# Patient Record
Sex: MALE | Race: WHITE | Employment: FULL TIME | URBAN - METROPOLITAN AREA
[De-identification: names, ages, dates, MRNs, and addresses within clinical notes are randomized per-mention and may not be internally consistent; named-entity substitution may affect disease eponyms.]

---

## 2017-01-31 ENCOUNTER — OFFICE VISIT (OUTPATIENT)
Dept: INTERNAL MEDICINE CLINIC | Facility: CLINIC | Age: 32
End: 2017-01-31

## 2017-01-31 VITALS
OXYGEN SATURATION: 97 % | HEART RATE: 97 BPM | WEIGHT: 178.19 LBS | TEMPERATURE: 98 F | HEIGHT: 68 IN | SYSTOLIC BLOOD PRESSURE: 108 MMHG | DIASTOLIC BLOOD PRESSURE: 78 MMHG | BODY MASS INDEX: 27.01 KG/M2

## 2017-01-31 DIAGNOSIS — N48.89 SORE OF PENIS: ICD-10-CM

## 2017-01-31 DIAGNOSIS — J06.9 UPPER RESPIRATORY TRACT INFECTION, UNSPECIFIED TYPE: Primary | ICD-10-CM

## 2017-01-31 DIAGNOSIS — R10.9 ABDOMINAL DISCOMFORT: ICD-10-CM

## 2017-01-31 PROCEDURE — 99212 OFFICE O/P EST SF 10 MIN: CPT | Performed by: INTERNAL MEDICINE

## 2017-01-31 PROCEDURE — 99214 OFFICE O/P EST MOD 30 MIN: CPT | Performed by: INTERNAL MEDICINE

## 2017-01-31 RX ORDER — AZITHROMYCIN 250 MG/1
TABLET, FILM COATED ORAL
Qty: 6 TABLET | Refills: 0 | Status: SHIPPED | OUTPATIENT
Start: 2017-01-31 | End: 2017-03-06 | Stop reason: ALTCHOICE

## 2017-01-31 NOTE — PROGRESS NOTES
Dannielle Monzon is a 36year old male   HPI:   Pt.presents for the following problems. Yesterday patient started with dry hacking cough. Chest discomfort with coughing. Some wheezing. She also has body aches. Achy lower back. Abdominal discomfort. Smoking Status: Never Smoker                      Smokeless Status: Never Used                        Alcohol Use: Yes           0.0 oz/week       0 Standard drinks or equivalent per week       Comment: 2-3 glasses of wine  daily          REVIEW OF SYSTEMS Patient comfortable with this plan. 2. Sore of penis  Discussed with patient. He strongly feels this is from excessive masturbation. I have asked patient to avoid masturbation for 7-10 days.   This area should heal.  I have also advised him that if [facility]

## 2017-02-01 ENCOUNTER — TELEPHONE (OUTPATIENT)
Dept: INTERNAL MEDICINE CLINIC | Facility: CLINIC | Age: 32
End: 2017-02-01

## 2017-02-01 NOTE — TELEPHONE ENCOUNTER
Pt. Would like to speak with Dr. Yassine Monzon  Didn't give any additional info   Ph. # [phone]   Routed to clinical

## 2017-02-01 NOTE — TELEPHONE ENCOUNTER
Suspect diarrhea is side effect of Zithromax use; hopefully, it will lessen as Zithromax dose decreases to 250 mg daily; if diarrhea persists, then Zithromax may be held, and an alternative antibiotic may be considered; advise continue Zithromax for now; a

## 2017-02-01 NOTE — TELEPHONE ENCOUNTER
Pt was here yesterday to see Dr. Donell Vilchis for a URI. He was given a Z-pack. He had diarrhea x 2 last night and is concerned about that. I recommended yogurt or probiotics with antibiotics. Routed to Dr. Bobbi Natarajan. Please advise.

## 2017-02-02 ENCOUNTER — OFFICE VISIT (OUTPATIENT)
Dept: INTERNAL MEDICINE CLINIC | Facility: CLINIC | Age: 32
End: 2017-02-02

## 2017-02-02 VITALS
DIASTOLIC BLOOD PRESSURE: 80 MMHG | HEART RATE: 80 BPM | TEMPERATURE: 99 F | HEIGHT: 68 IN | SYSTOLIC BLOOD PRESSURE: 114 MMHG | BODY MASS INDEX: 27.28 KG/M2 | WEIGHT: 180 LBS

## 2017-02-02 DIAGNOSIS — G25.81 RLS (RESTLESS LEGS SYNDROME): ICD-10-CM

## 2017-02-02 DIAGNOSIS — E87.6 HYPOKALEMIA: ICD-10-CM

## 2017-02-02 DIAGNOSIS — J06.9 UPPER RESPIRATORY TRACT INFECTION, UNSPECIFIED TYPE: Primary | ICD-10-CM

## 2017-02-02 DIAGNOSIS — N20.0 NEPHROLITHIASIS: ICD-10-CM

## 2017-02-02 PROCEDURE — 99212 OFFICE O/P EST SF 10 MIN: CPT | Performed by: INTERNAL MEDICINE

## 2017-02-02 PROCEDURE — 99214 OFFICE O/P EST MOD 30 MIN: CPT | Performed by: INTERNAL MEDICINE

## 2017-02-02 RX ORDER — AMOXICILLIN AND CLAVULANATE POTASSIUM 875; 125 MG/1; MG/1
1 TABLET, FILM COATED ORAL 2 TIMES DAILY
Qty: 20 TABLET | Refills: 0 | Status: SHIPPED | OUTPATIENT
Start: 2017-02-02 | End: 2017-02-12

## 2017-02-02 NOTE — PROGRESS NOTES
Kevin Ballard is a 36year old male. HPI:   Patient presents with:  URI: Pt was here on Tuesday with Dr. Lilly Allen. He started a Z-pack. He is expereincing sinus pressure on the left side of his face, though it does move.  He also has a nagging cough that Disp: 90 tablet Rfl: 3   hydrochlorothiazide 50 MG Oral Tab Take 1 tablet (50 mg total) by mouth daily.  Disp: 90 tablet Rfl: 3       Allergies:  No Known Allergies              ROS:   Constitutional: no weight loss; no fatigue  Cardiovascular:  Negative fo months           Orders This Visit:  No orders of the defined types were placed in this encounter.        Meds This Visit:    Signed Prescriptions Disp Refills    Amoxicillin-Pot Clavulanate (AUGMENTIN) 875-125 MG Oral Tab 20 tablet 0      Sig: Take 1 table

## 2017-03-03 ENCOUNTER — TELEPHONE (OUTPATIENT)
Dept: NEUROLOGY | Facility: CLINIC | Age: 32
End: 2017-03-03

## 2017-03-06 ENCOUNTER — OFFICE VISIT (OUTPATIENT)
Dept: NEUROLOGY | Facility: CLINIC | Age: 32
End: 2017-03-06

## 2017-03-06 VITALS
WEIGHT: 175 LBS | HEIGHT: 68 IN | DIASTOLIC BLOOD PRESSURE: 76 MMHG | HEART RATE: 76 BPM | BODY MASS INDEX: 26.52 KG/M2 | OXYGEN SATURATION: 98 % | SYSTOLIC BLOOD PRESSURE: 120 MMHG

## 2017-03-06 DIAGNOSIS — G47.61 PERIODIC LIMB MOVEMENTS OF SLEEP: Primary | ICD-10-CM

## 2017-03-06 PROCEDURE — 99244 OFF/OP CNSLTJ NEW/EST MOD 40: CPT | Performed by: OTHER

## 2017-03-06 NOTE — PATIENT INSTRUCTIONS
Refill policies:    • Allow 2 business days for refills; controlled substances may take longer. • Contact our office at least 5 days prior to running out of medication or submit request through the “request refill” option in your [facility] account.   • Ref have a procedure or additional testing performed. [facility]) will contact your insurance carrier to obtain pre-certification or prior authorization.     Unfortunately, [employer] has seen an increase in denial of payment even though the p

## 2017-03-06 NOTE — PROGRESS NOTES
Luma Shankster : 1985     HPI:   Patient presents with:  Neurologic Problem: New right-handed patient referred by Orlando Kerr. Pt c/o sleepness nights. Sx started 2 years ago. Pt was Dx with Restless leg syndrome.  Pt taking Clonazepam 0.5 MG, Which Disp: 90 tablet Rfl: 3     No current facility-administered medications for this visit. Past Medical History   Diagnosis Date   • Kidney stone      KUB 2008   • Esophageal reflux       History reviewed. No pertinent past surgical history.    Family Histor span and concentration. Speech and language normal.  Oriented times three. Recent and remote memory intact, with normal fund of knowledge. Cranial Nerves: II-Visual acuity grossly normal, with full visual fields. Pupil react to light.   Fundoscopic exam

## 2017-03-08 RX ORDER — HYDROCHLOROTHIAZIDE 50 MG/1
50 TABLET ORAL DAILY
Qty: 90 TABLET | Refills: 3 | OUTPATIENT
Start: 2017-03-08

## 2017-03-08 RX ORDER — PRAMIPEXOLE DIHYDROCHLORIDE 0.12 MG/1
0.12 TABLET ORAL NIGHTLY
Qty: 90 TABLET | Refills: 3 | OUTPATIENT
Start: 2017-03-08

## 2017-03-08 RX ORDER — POTASSIUM CHLORIDE 20 MEQ/1
20 TABLET, EXTENDED RELEASE ORAL DAILY
Qty: 90 TABLET | Refills: 3 | OUTPATIENT
Start: 2017-03-08

## 2017-03-09 RX ORDER — CLONAZEPAM 0.5 MG/1
0.5 TABLET ORAL NIGHTLY PRN
Qty: 90 TABLET | Refills: 1 | Status: SHIPPED
Start: 2017-03-09 | End: 2017-10-25

## 2017-03-09 RX ORDER — CLONAZEPAM 0.5 MG/1
0.5 TABLET ORAL NIGHTLY PRN
Qty: 30 TABLET | Refills: 3 | Status: SHIPPED
Start: 2017-03-09 | End: 2017-03-09

## 2017-03-31 ENCOUNTER — OFFICE VISIT (OUTPATIENT)
Dept: INTERNAL MEDICINE CLINIC | Facility: CLINIC | Age: 32
End: 2017-03-31

## 2017-03-31 VITALS
OXYGEN SATURATION: 96 % | BODY MASS INDEX: 27 KG/M2 | HEART RATE: 80 BPM | WEIGHT: 174.38 LBS | DIASTOLIC BLOOD PRESSURE: 76 MMHG | SYSTOLIC BLOOD PRESSURE: 106 MMHG | TEMPERATURE: 98 F

## 2017-03-31 DIAGNOSIS — J20.9 ACUTE BRONCHITIS, UNSPECIFIED ORGANISM: Primary | ICD-10-CM

## 2017-03-31 PROCEDURE — 99212 OFFICE O/P EST SF 10 MIN: CPT | Performed by: INTERNAL MEDICINE

## 2017-03-31 PROCEDURE — 99213 OFFICE O/P EST LOW 20 MIN: CPT | Performed by: INTERNAL MEDICINE

## 2017-03-31 RX ORDER — AMOXICILLIN AND CLAVULANATE POTASSIUM 875; 125 MG/1; MG/1
1 TABLET, FILM COATED ORAL 2 TIMES DAILY
Qty: 20 TABLET | Refills: 0 | Status: SHIPPED | OUTPATIENT
Start: 2017-03-31 | End: 2017-04-10

## 2017-03-31 NOTE — PROGRESS NOTES
Frances Khna is a 36year old male. Patient presents with:  Cough: dry cough - \"mucus at bottom of throat\", Robitussin DM is not helping - pt thinks he has early bronchitis. Sx onset: Wednesday. Denies fever/chills.     HPI:   Patient presents with:  C chest pain  GI: No abdominal pain, nausea, vomiting, diarrhea, or constipation  :No Urinary complaints  EXT:No complaints of pain or swelling in patient's legs    EXAM:   /76 mmHg  Pulse 80  Temp(Src) 98.4 °F (36.9 °C)  Wt 174 lb 6.4 oz (79.107 kg)

## 2017-03-31 NOTE — PATIENT INSTRUCTIONS
1.  Patient is to push fluids and rest.  2.  I will prescribe Augmentin 875 mg twice a day for 10 days for the patient to take. 3.  Patient can use his Sudafed as necessary for head congestion.   4.  Patient can continue to use Robitussin and Mucinex as ne

## 2017-04-20 ENCOUNTER — OFFICE VISIT (OUTPATIENT)
Dept: INTERNAL MEDICINE CLINIC | Facility: CLINIC | Age: 32
End: 2017-04-20

## 2017-04-20 VITALS
HEIGHT: 68 IN | DIASTOLIC BLOOD PRESSURE: 100 MMHG | BODY MASS INDEX: 26.83 KG/M2 | WEIGHT: 177 LBS | SYSTOLIC BLOOD PRESSURE: 122 MMHG | HEART RATE: 68 BPM | TEMPERATURE: 99 F

## 2017-04-20 DIAGNOSIS — G25.81 RLS (RESTLESS LEGS SYNDROME): ICD-10-CM

## 2017-04-20 DIAGNOSIS — R03.0 ELEVATED BP WITHOUT DIAGNOSIS OF HYPERTENSION: ICD-10-CM

## 2017-04-20 DIAGNOSIS — N20.0 NEPHROLITHIASIS: ICD-10-CM

## 2017-04-20 DIAGNOSIS — E87.6 HYPOKALEMIA: ICD-10-CM

## 2017-04-20 DIAGNOSIS — F32.A DEPRESSION, UNSPECIFIED DEPRESSION TYPE: ICD-10-CM

## 2017-04-20 DIAGNOSIS — R53.83 OTHER FATIGUE: Primary | ICD-10-CM

## 2017-04-20 PROCEDURE — 99214 OFFICE O/P EST MOD 30 MIN: CPT | Performed by: INTERNAL MEDICINE

## 2017-04-20 PROCEDURE — 99212 OFFICE O/P EST SF 10 MIN: CPT | Performed by: INTERNAL MEDICINE

## 2017-04-20 NOTE — PROGRESS NOTES
"Chief Complaint   Patient presents with     Eye Problem     right eye discharge       Initial Pulse 160  Temp 98  F (36.7  C) (Rectal)  Wt 13 lb 5.5 oz (6.053 kg) Estimated body mass index is 15.13 kg/(m^2) as calculated from the following:    Height as of 10/17/17: 1' 10.84\" (0.58 m).    Weight as of 10/17/17: 11 lb 3.5 oz (5.089 kg).  Medication Reconciliation: complete   RANDOLPH Marte MA      " Kristi Najera is a 36year old male. HPI:   Patient presents with:  Depression: Pt states he has \"self-diagnosed himself with depression\" and wanted to get a medical opinion.  He states he has not felt motivated to do things, has a poor appetite and d daily. Disp: 90 tablet Rfl: 3       Allergies:  No Known Allergies              ROS:   Constitutional: no weight loss; no fatigue  Cardiovascular:  Negative for chest pain; negative palpitations  Respiratory:  Negative for cough, dyspnea and wheezing  Trupti placed in this encounter. Meds This Visit:    Signed Prescriptions Disp Refills    Sertraline HCl 50 MG Oral Tab 30 tablet 5      Sig: Take 1 tablet (50 mg total) by mouth daily.            Imaging & Referrals:  None     4/20/2017  BRII Arellano

## 2017-04-24 ENCOUNTER — TELEPHONE (OUTPATIENT)
Dept: INTERNAL MEDICINE CLINIC | Facility: CLINIC | Age: 32
End: 2017-04-24

## 2017-04-24 NOTE — TELEPHONE ENCOUNTER
Please advise - called patient who is stating he is taking 25 mg zoloft - he is really tired and drowsy ( day 3 today) - to DR. XIONG

## 2017-04-24 NOTE — TELEPHONE ENCOUNTER
Advise change Zoloft 25 mg to qHS; fatigue is a possible side effect of Zoloft, though usually side effect is less pronounced with continued use; advise patient try to continue taking for the next 1-2 weeks to see if his body becomes tolerant to side effec

## 2017-04-24 NOTE — TELEPHONE ENCOUNTER
Re:  The most recent medication he is on (could not remember name). Patient is having side affects & he wants to discuss.

## 2017-04-24 NOTE — TELEPHONE ENCOUNTER
Pt instructed to take Zoloft at HS instead of during the daytime to see if this helps his fatigue. He will begin tomorrow night.

## 2017-04-26 ENCOUNTER — TELEPHONE (OUTPATIENT)
Dept: INTERNAL MEDICINE CLINIC | Facility: CLINIC | Age: 32
End: 2017-04-26

## 2017-04-26 NOTE — TELEPHONE ENCOUNTER
Pt states he will call later today to obtain [facility] papers. He states he has noticed a little improvement since starting Sertraline. Pt had started this almost one week ago. Pt had to miss work yesterday due to the depression.  I asked pt if he is having any

## 2017-04-26 NOTE — TELEPHONE ENCOUNTER
Pt. Is calling because he wants to go on FMLA for depression, he hasn't received any forms yet & would like to speak with Dr. Devyn Lam.  # [phone]

## 2017-04-27 NOTE — TELEPHONE ENCOUNTER
Please call pt when receive the FMLA Paperwork, he was told yesterday it would take 3 - 5 days to process then they will fax to the office  Pt phone [phone]     Tasked to nursing

## 2017-04-28 ENCOUNTER — TELEPHONE (OUTPATIENT)
Dept: INTERNAL MEDICINE CLINIC | Facility: CLINIC | Age: 32
End: 2017-04-28

## 2017-04-28 RX ORDER — BUPROPION HYDROCHLORIDE 150 MG/1
150 TABLET, EXTENDED RELEASE ORAL 2 TIMES DAILY
Qty: 60 TABLET | Refills: 5 | Status: SHIPPED | OUTPATIENT
Start: 2017-04-28 | End: 2018-02-12 | Stop reason: ALTCHOICE

## 2017-04-28 NOTE — TELEPHONE ENCOUNTER
Imp- Depression; has been on sertraline 50 mg po qD since 4/20/17;  Rec- stop sertraline; start bupropion  mg po BID #60, 5RF; ERx sent; pt called

## 2017-04-28 NOTE — TELEPHONE ENCOUNTER
Please call pt, has question about Sertraline.   Pt notices that he is fatigued all the time, even when he takes at night only gets about 4 hours of sleep  Please call to discuss/advise  Tasked to nursing

## 2017-05-02 RX ORDER — PRAMIPEXOLE DIHYDROCHLORIDE 0.12 MG/1
TABLET ORAL
Qty: 90 TABLET | Refills: 5 | OUTPATIENT
Start: 2017-05-02

## 2017-05-04 RX ORDER — POTASSIUM CHLORIDE 20 MEQ/1
TABLET, EXTENDED RELEASE ORAL
Qty: 90 TABLET | Refills: 3 | Status: SHIPPED | OUTPATIENT
Start: 2017-05-04 | End: 2017-12-08

## 2017-05-04 RX ORDER — HYDROCHLOROTHIAZIDE 50 MG/1
TABLET ORAL
Qty: 90 TABLET | Refills: 3 | Status: SHIPPED | OUTPATIENT
Start: 2017-05-04 | End: 2017-07-19

## 2017-05-18 ENCOUNTER — OFFICE VISIT (OUTPATIENT)
Dept: INTERNAL MEDICINE CLINIC | Facility: CLINIC | Age: 32
End: 2017-05-18

## 2017-05-18 VITALS
SYSTOLIC BLOOD PRESSURE: 118 MMHG | OXYGEN SATURATION: 98 % | DIASTOLIC BLOOD PRESSURE: 76 MMHG | BODY MASS INDEX: 26 KG/M2 | WEIGHT: 171.63 LBS | HEART RATE: 98 BPM | TEMPERATURE: 98 F

## 2017-05-18 DIAGNOSIS — N20.0 NEPHROLITHIASIS: ICD-10-CM

## 2017-05-18 DIAGNOSIS — G25.81 RLS (RESTLESS LEGS SYNDROME): ICD-10-CM

## 2017-05-18 DIAGNOSIS — F32.A DEPRESSION, UNSPECIFIED DEPRESSION TYPE: ICD-10-CM

## 2017-05-18 DIAGNOSIS — E87.6 HYPOKALEMIA: ICD-10-CM

## 2017-05-18 DIAGNOSIS — R53.83 OTHER FATIGUE: Primary | ICD-10-CM

## 2017-05-18 PROCEDURE — 99212 OFFICE O/P EST SF 10 MIN: CPT | Performed by: INTERNAL MEDICINE

## 2017-05-18 PROCEDURE — 99214 OFFICE O/P EST MOD 30 MIN: CPT | Performed by: INTERNAL MEDICINE

## 2017-05-18 NOTE — PROGRESS NOTES
Kevin Ballard is a 36year old male. HPI:   Patient presents with:  Checkup: Feeling better with wellbutrin. Still going to counseling - \"I definitely feel better than I did\".       35 y/o M with fatigue, depression here for F/U; pt attempted trial s Disp: 90 tablet Rfl: 3       Allergies:  No Known Allergies              ROS:   Constitutional: no weight loss; no fatigue  Cardiovascular:  Negative for chest pain; negative palpitations  Respiratory:  Negative for cough, dyspnea and wheezing  Gastrointes Lipids in July 2016  Elevated BP  Advise stop alcohol use; had been drinking 2-3 glasses wine daily      cell [phone]   RTC 1 month         Orders This Visit:  No orders of the defined types were placed in this encounter.        Meds This Visit:    No

## 2017-07-19 RX ORDER — PRAMIPEXOLE DIHYDROCHLORIDE 0.12 MG/1
TABLET ORAL
Qty: 30 TABLET | Refills: 0 | OUTPATIENT
Start: 2017-07-19

## 2017-07-19 RX ORDER — POTASSIUM CHLORIDE 20 MEQ/1
TABLET, EXTENDED RELEASE ORAL
Qty: 90 TABLET | Refills: 0 | Status: SHIPPED | OUTPATIENT
Start: 2017-07-19 | End: 2017-10-25

## 2017-07-19 RX ORDER — HYDROCHLOROTHIAZIDE 50 MG/1
TABLET ORAL
Qty: 90 TABLET | Refills: 0 | Status: SHIPPED | OUTPATIENT
Start: 2017-07-19 | End: 2017-10-25

## 2017-07-19 RX ORDER — HYDROCHLOROTHIAZIDE 50 MG/1
50 TABLET ORAL
Qty: 90 TABLET | Refills: 3 | Status: SHIPPED | OUTPATIENT
Start: 2017-07-19 | End: 2018-07-02 | Stop reason: ALTCHOICE

## 2017-07-19 NOTE — TELEPHONE ENCOUNTER
Pt. Is calling to check status of refill pt. Is out of meds  Ph. # [phone]   [facility] ph.  # [phone]   Routed to Rx

## 2017-07-19 NOTE — TELEPHONE ENCOUNTER
Second refill request received for Hydrochlorothiazide, Pramipexole, and Potassium Cl.     Tasked to Rx

## 2017-07-19 NOTE — TELEPHONE ENCOUNTER
Potassium and hydrochlorothiazide was refilled 5/4/17 for a year supply. Pramipexole was refilled 10/3/16 for a one year supply, so he should have enough until 10/3/2017    Called patient, no answer.  LMTCB

## 2017-07-19 NOTE — TELEPHONE ENCOUNTER
[facility] called and did not have record of refill order for hydrochlorothiazide or potassium. New script sent.

## 2017-07-19 NOTE — TELEPHONE ENCOUNTER
Called patient and relayed message - patient states that pharmacy di dnot fill medication .  He will call pharmacy to get in contact with our office regarding this issue

## 2017-09-28 ENCOUNTER — TELEPHONE (OUTPATIENT)
Dept: INTERNAL MEDICINE CLINIC | Facility: CLINIC | Age: 32
End: 2017-09-28

## 2017-09-28 NOTE — TELEPHONE ENCOUNTER
Called pt. He states he has a pain in the back of his left leg. It is intermittent, lasting about 4-6 seconds (sharp pain) and then subsides for 20-60 minutes, then repeat. He states he doesn't see a correlation with activity. Routed to Dr. Ada NUNEZ

## 2017-09-28 NOTE — TELEPHONE ENCOUNTER
Imp- left posterior calf pain; pain is brief and lasts 4-6 seconds; denies swelling; Rec- trial  mg po qD; pt called; requests note for Dx - depression for hypnotherapy to justify FLEX spending

## 2017-10-25 ENCOUNTER — OFFICE VISIT (OUTPATIENT)
Dept: INTERNAL MEDICINE CLINIC | Facility: CLINIC | Age: 32
End: 2017-10-25

## 2017-10-25 VITALS
BODY MASS INDEX: 26.37 KG/M2 | WEIGHT: 174 LBS | DIASTOLIC BLOOD PRESSURE: 90 MMHG | SYSTOLIC BLOOD PRESSURE: 116 MMHG | HEIGHT: 68 IN | HEART RATE: 80 BPM | TEMPERATURE: 99 F

## 2017-10-25 DIAGNOSIS — N20.0 NEPHROLITHIASIS: ICD-10-CM

## 2017-10-25 DIAGNOSIS — E78.00 HYPERCHOLESTEREMIA: ICD-10-CM

## 2017-10-25 DIAGNOSIS — M79.662 PAIN OF LEFT CALF: Primary | ICD-10-CM

## 2017-10-25 DIAGNOSIS — G25.81 RLS (RESTLESS LEGS SYNDROME): ICD-10-CM

## 2017-10-25 PROCEDURE — 99212 OFFICE O/P EST SF 10 MIN: CPT | Performed by: INTERNAL MEDICINE

## 2017-10-25 PROCEDURE — 99214 OFFICE O/P EST MOD 30 MIN: CPT | Performed by: INTERNAL MEDICINE

## 2017-10-25 RX ORDER — CLONAZEPAM 0.5 MG/1
0.5 TABLET ORAL NIGHTLY PRN
Qty: 90 TABLET | Refills: 1 | Status: SHIPPED
Start: 2017-10-25 | End: 2018-05-06

## 2017-10-25 NOTE — PROGRESS NOTES
Dannielle Monzon is a 29year old male. HPI:   Patient presents with:  Checkup: Pt states he has \"heaviness in his legs\" that he has never experienced. He also states his restless leg syndrome was \"out of control\" during the day.  He also wants to addr and vomiting; no melena or hematochezia  All other review of systems are negative. PHYSICAL EXAM:   Blood pressure 116/90, pulse 80, temperature 98.7 °F (37.1 °C), temperature source Oral, height 5' 8\" (1.727 m), weight 174 lb (78.9 kg).   [facility]    ClonazePAM 0.5 MG Oral Tab 90 tablet 1      Sig: Take 1 tablet (0.5 mg total) by mouth nightly as needed (restless legs).            Imaging & Referrals:  None     10/25/2017  Lois Banuelos MD

## 2017-11-07 RX ORDER — POTASSIUM CHLORIDE 20 MEQ/1
TABLET, EXTENDED RELEASE ORAL
Qty: 90 TABLET | Refills: 0 | OUTPATIENT
Start: 2017-11-07

## 2017-11-15 NOTE — TELEPHONE ENCOUNTER
Per our records potassium chloride was eRxed on 5/4/17 for a year. Called [facility] and spoke with pharmacist who states an error was made when manufactures were changed. They did not add the refills from the original order in May.  Order was corrected and

## 2017-11-16 RX ORDER — POTASSIUM CHLORIDE 20 MEQ/1
TABLET, EXTENDED RELEASE ORAL
Qty: 90 TABLET | Refills: 0 | OUTPATIENT
Start: 2017-11-16

## 2017-11-17 RX ORDER — POTASSIUM CHLORIDE 20 MEQ/1
TABLET, EXTENDED RELEASE ORAL
Qty: 90 TABLET | Refills: 0 | OUTPATIENT
Start: 2017-11-17

## 2017-12-08 RX ORDER — POTASSIUM CHLORIDE 20 MEQ/1
20 TABLET, EXTENDED RELEASE ORAL
Qty: 90 TABLET | Refills: 0 | Status: SHIPPED | OUTPATIENT
Start: 2017-12-08 | End: 2018-02-12

## 2017-12-08 NOTE — TELEPHONE ENCOUNTER
Pt. Spoke with the pharmacy and was told that he needs a doctor's approval for his Rx he did explain to them that he should have refills left on his Rx for Potassium the pharmacy stated they don't have a record of that ph.  # [phone]   Routed to Rx

## 2017-12-08 NOTE — TELEPHONE ENCOUNTER
eRx sent for new order.  LMOVM per HIPAA notifying pt and reminding pt to schedule appt as he is due and due for labs

## 2018-02-12 ENCOUNTER — OFFICE VISIT (OUTPATIENT)
Dept: INTERNAL MEDICINE CLINIC | Facility: CLINIC | Age: 33
End: 2018-02-12

## 2018-02-12 ENCOUNTER — LAB ENCOUNTER (OUTPATIENT)
Dept: LAB | Age: 33
End: 2018-02-12
Attending: INTERNAL MEDICINE
Payer: COMMERCIAL

## 2018-02-12 VITALS
TEMPERATURE: 97 F | HEART RATE: 96 BPM | WEIGHT: 174 LBS | HEIGHT: 67.75 IN | SYSTOLIC BLOOD PRESSURE: 112 MMHG | BODY MASS INDEX: 26.68 KG/M2 | DIASTOLIC BLOOD PRESSURE: 90 MMHG | OXYGEN SATURATION: 99 %

## 2018-02-12 DIAGNOSIS — E78.00 HYPERCHOLESTEREMIA: ICD-10-CM

## 2018-02-12 DIAGNOSIS — N20.0 NEPHROLITHIASIS: ICD-10-CM

## 2018-02-12 DIAGNOSIS — Z00.00 PHYSICAL EXAM, ANNUAL: ICD-10-CM

## 2018-02-12 DIAGNOSIS — Z00.00 PHYSICAL EXAM, ANNUAL: Primary | ICD-10-CM

## 2018-02-12 DIAGNOSIS — F32.A DEPRESSION, UNSPECIFIED DEPRESSION TYPE: ICD-10-CM

## 2018-02-12 DIAGNOSIS — J06.9 UPPER RESPIRATORY TRACT INFECTION, UNSPECIFIED TYPE: ICD-10-CM

## 2018-02-12 DIAGNOSIS — G25.81 RLS (RESTLESS LEGS SYNDROME): ICD-10-CM

## 2018-02-12 LAB
ALBUMIN SERPL BCP-MCNC: 4.6 G/DL (ref 3.5–4.8)
ALBUMIN/GLOB SERPL: 1.5 {RATIO} (ref 1–2)
ALP SERPL-CCNC: 60 U/L (ref 32–100)
ALT SERPL-CCNC: 38 U/L (ref 17–63)
ANION GAP SERPL CALC-SCNC: 9 MMOL/L (ref 0–18)
AST SERPL-CCNC: 24 U/L (ref 15–41)
BASOPHILS # BLD: 0.1 K/UL (ref 0–0.2)
BASOPHILS NFR BLD: 1 %
BILIRUB SERPL-MCNC: 0.9 MG/DL (ref 0.3–1.2)
BUN SERPL-MCNC: 6 MG/DL (ref 8–20)
BUN/CREAT SERPL: 5.5 (ref 10–20)
CALCIUM SERPL-MCNC: 9.5 MG/DL (ref 8.5–10.5)
CHLORIDE SERPL-SCNC: 100 MMOL/L (ref 95–110)
CHOLEST SERPL-MCNC: 228 MG/DL (ref 110–200)
CO2 SERPL-SCNC: 30 MMOL/L (ref 22–32)
CREAT SERPL-MCNC: 1.09 MG/DL (ref 0.5–1.5)
EOSINOPHIL # BLD: 0.3 K/UL (ref 0–0.7)
EOSINOPHIL NFR BLD: 4 %
ERYTHROCYTE [DISTWIDTH] IN BLOOD BY AUTOMATED COUNT: 13.3 % (ref 11–15)
GLOBULIN PLAS-MCNC: 3.1 G/DL (ref 2.5–3.7)
GLUCOSE SERPL-MCNC: 88 MG/DL (ref 70–99)
HCT VFR BLD AUTO: 51.7 % (ref 41–52)
HDLC SERPL-MCNC: 46 MG/DL
HGB BLD-MCNC: 17.2 G/DL (ref 13.5–17.5)
LDLC SERPL CALC-MCNC: 156 MG/DL (ref 0–99)
LYMPHOCYTES # BLD: 1.6 K/UL (ref 1–4)
LYMPHOCYTES NFR BLD: 22 %
MCH RBC QN AUTO: 30 PG (ref 27–32)
MCHC RBC AUTO-ENTMCNC: 33.3 G/DL (ref 32–37)
MCV RBC AUTO: 90.1 FL (ref 80–100)
MONOCYTES # BLD: 1 K/UL (ref 0–1)
MONOCYTES NFR BLD: 14 %
NEUTROPHILS # BLD AUTO: 4.3 K/UL (ref 1.8–7.7)
NEUTROPHILS NFR BLD: 58 %
NONHDLC SERPL-MCNC: 182 MG/DL
OSMOLALITY UR CALC.SUM OF ELEC: 285 MOSM/KG (ref 275–295)
PATIENT FASTING: YES
PLATELET # BLD AUTO: 210 K/UL (ref 140–400)
PMV BLD AUTO: 10 FL (ref 7.4–10.3)
POTASSIUM SERPL-SCNC: 3.4 MMOL/L (ref 3.3–5.1)
PROT SERPL-MCNC: 7.7 G/DL (ref 5.9–8.4)
RBC # BLD AUTO: 5.74 M/UL (ref 4.5–5.9)
SODIUM SERPL-SCNC: 139 MMOL/L (ref 136–144)
TRIGL SERPL-MCNC: 131 MG/DL (ref 1–149)
TSH SERPL-ACNC: 1.13 UIU/ML (ref 0.45–5.33)
WBC # BLD AUTO: 7.3 K/UL (ref 4–11)

## 2018-02-12 PROCEDURE — 99395 PREV VISIT EST AGE 18-39: CPT | Performed by: INTERNAL MEDICINE

## 2018-02-12 PROCEDURE — 80061 LIPID PANEL: CPT

## 2018-02-12 PROCEDURE — 85025 COMPLETE CBC W/AUTO DIFF WBC: CPT

## 2018-02-12 PROCEDURE — 84443 ASSAY THYROID STIM HORMONE: CPT

## 2018-02-12 PROCEDURE — 36415 COLL VENOUS BLD VENIPUNCTURE: CPT

## 2018-02-12 PROCEDURE — 80053 COMPREHEN METABOLIC PANEL: CPT

## 2018-02-12 RX ORDER — AMOXICILLIN AND CLAVULANATE POTASSIUM 875; 125 MG/1; MG/1
1 TABLET, FILM COATED ORAL 2 TIMES DAILY
Qty: 20 TABLET | Refills: 0 | Status: SHIPPED | OUTPATIENT
Start: 2018-02-12 | End: 2018-02-22

## 2018-02-12 RX ORDER — PRAMIPEXOLE DIHYDROCHLORIDE 0.12 MG/1
0.12 TABLET ORAL NIGHTLY
Qty: 90 TABLET | Refills: 3 | Status: SHIPPED | OUTPATIENT
Start: 2018-02-12 | End: 2018-06-27

## 2018-02-12 RX ORDER — POTASSIUM CHLORIDE 20 MEQ/1
20 TABLET, EXTENDED RELEASE ORAL
Qty: 90 TABLET | Refills: 3 | Status: SHIPPED | OUTPATIENT
Start: 2018-02-12 | End: 2018-07-02 | Stop reason: ALTCHOICE

## 2018-02-12 NOTE — PROGRESS NOTES
Chao Salazar is a 29year old male.     HPI:   Patient presents with:  Physical: Annual Physical Visit  Checkup: Runny nose, sore throat, sinus congestion, cough; ear pressure - started wednesday last week      28 y/o M here for physical exam; h as had on increased activity, polydipsia and polyphagia  Gastrointestinal:  Negative for abdominal pain, constipation, decreased appetite, diarrhea and vomiting; no melena or hematochezia  Musculoskeletal:  Negative for arthralgias or myalgias  Genitourinary:  Negat alcohol use; had been drinking 2-3 glasses wine daily, though now cutting back; pt seen by neurologist,  Dr Eddi Pace; this issue appears to be stabilizing  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

## 2018-02-15 ENCOUNTER — TELEPHONE (OUTPATIENT)
Dept: INTERNAL MEDICINE CLINIC | Facility: CLINIC | Age: 33
End: 2018-02-15

## 2018-02-15 NOTE — TELEPHONE ENCOUNTER
Labs 2/12 reviewed; Tchol 228, ; Imp- hypercholesterolemia;  Rec- re-check Lipids in 1 years; pt called

## 2018-02-15 NOTE — TELEPHONE ENCOUNTER
Pt had labs drawn on Monday 2/12 at an [address] [place] [address] lab, pt calling for labs   Please call pt [phone]    Tasked to nursing

## 2018-03-22 RX ORDER — PRAMIPEXOLE DIHYDROCHLORIDE 0.12 MG/1
TABLET ORAL
Qty: 90 TABLET | Refills: 3 | Status: SHIPPED | OUTPATIENT
Start: 2018-03-22 | End: 2018-06-27

## 2018-05-07 RX ORDER — CLONAZEPAM 0.5 MG/1
TABLET ORAL
Qty: 90 TABLET | Refills: 3 | Status: SHIPPED
Start: 2018-05-07 | End: 2018-06-27

## 2018-05-08 RX ORDER — PRAMIPEXOLE DIHYDROCHLORIDE 0.12 MG/1
TABLET ORAL
Qty: 90 TABLET | Refills: 0 | OUTPATIENT
Start: 2018-05-08

## 2018-06-18 RX ORDER — PRAMIPEXOLE DIHYDROCHLORIDE 0.12 MG/1
TABLET ORAL
Qty: 90 TABLET | Refills: 0 | OUTPATIENT
Start: 2018-06-18

## 2018-06-23 RX ORDER — PRAMIPEXOLE DIHYDROCHLORIDE 0.12 MG/1
TABLET ORAL
Qty: 90 TABLET | Refills: 0 | OUTPATIENT
Start: 2018-06-23

## 2018-06-26 ENCOUNTER — TELEPHONE (OUTPATIENT)
Dept: INTERNAL MEDICINE CLINIC | Facility: CLINIC | Age: 33
End: 2018-06-26

## 2018-06-26 NOTE — TELEPHONE ENCOUNTER
Pt is requesting an increase on medications;   CLONAZEPAM 0.5 MG Oral Tab  PRAMIPEXOLE DIHYDROCHLORIDE 0.125 MG Oral Tab .   Best call back is [phone], please try to call after 4pm, if patient doesn't answer before or after 4pm, okay to leave message v

## 2018-06-26 NOTE — TELEPHONE ENCOUNTER
Refill for Mirapex 0.125mg last authorized 3/22/18 #90 with 3 refills to [facility]. Refill for clonazepam 0.5mg last authorized 5/7/18 #90 with 3 refills, faxed to pharmacy. Called patient to clarify the refill requests.  Per the patient he was in

## 2018-06-27 RX ORDER — CLONAZEPAM 0.5 MG/1
TABLET ORAL
Qty: 90 TABLET | Refills: 3 | Status: SHIPPED
Start: 2018-06-27 | End: 2018-12-21

## 2018-06-27 RX ORDER — PRAMIPEXOLE DIHYDROCHLORIDE 0.12 MG/1
TABLET ORAL
Qty: 90 TABLET | Refills: 3 | Status: SHIPPED | OUTPATIENT
Start: 2018-06-27 | End: 2018-07-16

## 2018-06-28 RX ORDER — PRAMIPEXOLE DIHYDROCHLORIDE 0.12 MG/1
TABLET ORAL
Qty: 90 TABLET | Refills: 3 | OUTPATIENT
Start: 2018-06-28

## 2018-06-28 NOTE — TELEPHONE ENCOUNTER
Refilled pramipexole 0.125 mg po qHS #90, 3RF, and clonazepam 0.5 mg po qHS #90, 3RF; Rx FAXed; please notify pt

## 2018-06-28 NOTE — TELEPHONE ENCOUNTER
To Dr. Alessandra Levine - your message relayed to pt who verbalized understanding. Pt stated you were to call him to discuss increase in medication dosages.

## 2018-07-02 ENCOUNTER — TELEPHONE (OUTPATIENT)
Dept: INTERNAL MEDICINE CLINIC | Facility: CLINIC | Age: 33
End: 2018-07-02

## 2018-07-02 ENCOUNTER — HOSPITAL ENCOUNTER (OUTPATIENT)
Age: 33
Discharge: HOME | End: 2018-07-02
Attending: FAMILY MEDICINE
Payer: COMMERCIAL

## 2018-07-02 ENCOUNTER — APPOINTMENT (OUTPATIENT)
Dept: GENERAL RADIOLOGY | Age: 33
End: 2018-07-02
Attending: FAMILY MEDICINE
Payer: COMMERCIAL

## 2018-07-02 VITALS
RESPIRATION RATE: 16 BRPM | WEIGHT: 180 LBS | HEIGHT: 68 IN | SYSTOLIC BLOOD PRESSURE: 138 MMHG | BODY MASS INDEX: 27.28 KG/M2 | TEMPERATURE: 99 F | HEART RATE: 72 BPM | OXYGEN SATURATION: 96 % | DIASTOLIC BLOOD PRESSURE: 93 MMHG

## 2018-07-02 DIAGNOSIS — R07.89 COSTOCHONDRAL CHEST PAIN: Primary | ICD-10-CM

## 2018-07-02 LAB
#LYMPHOCYTE IC: 2.7 X10ˆ3/UL (ref 0.9–3.2)
#MXD IC: 1.1 X10ˆ3/UL (ref 0.1–1)
#NEUTROPHIL IC: 6 X10ˆ3/UL (ref 1.3–6.7)
BASOPHILS # BLD AUTO: 0.07 X10(3) UL (ref 0–0.1)
BASOPHILS NFR BLD AUTO: 0.7 %
CREAT SERPL-MCNC: 1 MG/DL (ref 0.7–1.3)
EOSINOPHIL # BLD AUTO: 0.18 X10(3) UL (ref 0–0.3)
EOSINOPHIL NFR BLD AUTO: 1.8 %
ERYTHROCYTE [DISTWIDTH] IN BLOOD BY AUTOMATED COUNT: 12.9 % (ref 11.5–16)
GLUCOSE BLD-MCNC: 87 MG/DL (ref 70–99)
HCT IC: 49.8 % (ref 37–54)
HCT VFR BLD AUTO: 50.7 % (ref 37–53)
HGB BLD-MCNC: 17.3 G/DL (ref 13–17)
HGB IC: 16.7 G/DL (ref 13–17)
IMMATURE GRANULOCYTE COUNT: 0.06 X10(3) UL (ref 0–1)
IMMATURE GRANULOCYTE RATIO %: 0.6 %
ISTAT BLOOD GAS TCO2: 27 MMOL/L (ref 22–32)
ISTAT BUN: 16 MG/DL (ref 8–20)
ISTAT CHLORIDE: 99 MMOL/L (ref 101–111)
ISTAT HEMATOCRIT: 51 % (ref 37–53)
ISTAT IONIZED CALCIUM: 1.14 MMOL/L (ref 1.12–1.32)
ISTAT POTASSIUM: 3.7 MMOL/L (ref 3.6–5.1)
ISTAT SODIUM: 140 MMOL/L (ref 136–144)
ISTAT TROPONIN: <0.1 NG/ML (ref ?–0.1)
LYMPHOCYTES # BLD AUTO: 2.65 X10(3) UL (ref 0.9–4)
LYMPHOCYTES NFR BLD AUTO: 27 %
LYMPHOCYTES NFR BLD AUTO: 27.6 %
MCH IC: 30 PG (ref 27–33.2)
MCH RBC QN AUTO: 30 PG (ref 27–33.2)
MCHC IC: 33.5 G/DL (ref 31–37)
MCHC RBC AUTO-ENTMCNC: 34.1 G/DL (ref 31–37)
MCV IC: 89.6 FL (ref 80–99)
MCV RBC AUTO: 88 FL (ref 80–99)
MIXED CELL %: 11.4 %
MONOCYTES # BLD AUTO: 1.1 X10(3) UL (ref 0.1–1)
MONOCYTES NFR BLD AUTO: 11.2 %
NEUTROPHIL ABS PRELIM: 5.77 X10 (3) UL (ref 1.3–6.7)
NEUTROPHILS # BLD AUTO: 5.77 X10(3) UL (ref 1.3–6.7)
NEUTROPHILS NFR BLD AUTO: 58.7 %
NEUTROPHILS NFR BLD AUTO: 61 %
PLATELET # BLD AUTO: 221 10(3)UL (ref 150–450)
RBC # BLD AUTO: 5.76 X10(6)UL (ref 4.3–5.7)
RBC IC: 5.56 X10ˆ6/UL (ref 4.3–5.7)
RED CELL DISTRIBUTION WIDTH-SD: 41.8 FL (ref 35.1–46.3)
WBC # BLD AUTO: 9.8 X10(3) UL (ref 4–13)
WBC IC: 9.8 X10ˆ3/UL (ref 4–13)

## 2018-07-02 PROCEDURE — 84484 ASSAY OF TROPONIN QUANT: CPT

## 2018-07-02 PROCEDURE — 93010 ELECTROCARDIOGRAM REPORT: CPT | Performed by: INTERNAL MEDICINE

## 2018-07-02 PROCEDURE — 99205 OFFICE O/P NEW HI 60 MIN: CPT

## 2018-07-02 PROCEDURE — 85025 COMPLETE CBC W/AUTO DIFF WBC: CPT | Performed by: FAMILY MEDICINE

## 2018-07-02 PROCEDURE — 93005 ELECTROCARDIOGRAM TRACING: CPT

## 2018-07-02 PROCEDURE — 71046 X-RAY EXAM CHEST 2 VIEWS: CPT | Performed by: FAMILY MEDICINE

## 2018-07-02 PROCEDURE — 93010 ELECTROCARDIOGRAM REPORT: CPT

## 2018-07-02 PROCEDURE — 36415 COLL VENOUS BLD VENIPUNCTURE: CPT

## 2018-07-02 PROCEDURE — 80047 BASIC METABLC PNL IONIZED CA: CPT

## 2018-07-02 RX ORDER — MELOXICAM 15 MG/1
15 TABLET ORAL DAILY
Qty: 20 TABLET | Refills: 0 | Status: SHIPPED | OUTPATIENT
Start: 2018-07-02 | End: 2018-07-16

## 2018-07-02 NOTE — ED INITIAL ASSESSMENT (HPI)
Yesterday, patient felt a pain under left breast/rib area. Took OTC GERD meds with minimal relief. Denies injury. States it can be painful to take a deep breath.

## 2018-07-02 NOTE — ED PROVIDER NOTES
Patient Seen in: [facility]    History   Patient presents with:  Trauma (cardiovascular, musculoskeletal)    Stated Complaint: Upper Left Rib Pain x 1 day     HPI    81-year-old male was driving home from a friend's house 1 with pain to the left anterior chest with going from sitting to supine position and vice versa. Abd: Soft, Nml BS, NT, ND, no rebound, no guarding. No CVAT to percussion Jean Pierre  Extremeties: No edema. +2 Jean Pierre DP/PT  Neuro: A&O x3. No focal deficits.   Skin: No total) by mouth daily. , Normal, Disp-20 tablet, R-0

## 2018-07-02 NOTE — TELEPHONE ENCOUNTER
Pt wanted to schedule an appt with  in the next few days. Pt started having chest pains around 6:30 pm last night. He took Tums but they didn't help. He said the pain is pretty constant.     Tasked high to Nursing    (There was no nurse available to transfer the call.)

## 2018-07-02 NOTE — TELEPHONE ENCOUNTER
To Dr. Johnson Erps - see below - shooting, stabbing just below breast - 3/10. Pt denies SOB, diaphoresis, fever, chills, another other pain. Also took GasX and pepto bismal with no results. Pt does not think he should go to ER - advised pt if sx change/worsen he should call 911, understanding verbalized.

## 2018-07-03 LAB
ATRIAL RATE: 65 BPM
P AXIS: 34 DEGREES
P-R INTERVAL: 158 MS
Q-T INTERVAL: 396 MS
QRS DURATION: 88 MS
QTC CALCULATION (BEZET): 411 MS
R AXIS: 28 DEGREES
T AXIS: 35 DEGREES
VENTRICULAR RATE: 65 BPM

## 2018-07-16 ENCOUNTER — APPOINTMENT (OUTPATIENT)
Dept: LAB | Facility: HOSPITAL | Age: 33
End: 2018-07-16
Attending: INTERNAL MEDICINE
Payer: COMMERCIAL

## 2018-07-16 ENCOUNTER — OFFICE VISIT (OUTPATIENT)
Dept: INTERNAL MEDICINE CLINIC | Facility: CLINIC | Age: 33
End: 2018-07-16

## 2018-07-16 VITALS
HEIGHT: 67.75 IN | DIASTOLIC BLOOD PRESSURE: 82 MMHG | TEMPERATURE: 99 F | HEART RATE: 87 BPM | WEIGHT: 185 LBS | RESPIRATION RATE: 16 BRPM | OXYGEN SATURATION: 98 % | BODY MASS INDEX: 28.36 KG/M2 | SYSTOLIC BLOOD PRESSURE: 110 MMHG

## 2018-07-16 DIAGNOSIS — N20.0 NEPHROLITHIASIS: ICD-10-CM

## 2018-07-16 DIAGNOSIS — R35.89 POLYURIA: ICD-10-CM

## 2018-07-16 DIAGNOSIS — G25.81 RLS (RESTLESS LEGS SYNDROME): Primary | ICD-10-CM

## 2018-07-16 DIAGNOSIS — E78.00 HYPERCHOLESTEREMIA: ICD-10-CM

## 2018-07-16 LAB
BILIRUB UR QL: NEGATIVE
CLARITY UR: CLEAR
COLOR UR: YELLOW
GLUCOSE UR-MCNC: NEGATIVE MG/DL
HGB UR QL STRIP.AUTO: NEGATIVE
KETONES UR-MCNC: NEGATIVE MG/DL
LEUKOCYTE ESTERASE UR QL STRIP.AUTO: NEGATIVE
NITRITE UR QL STRIP.AUTO: NEGATIVE
PH UR: 6 [PH] (ref 5–8)
PROT UR-MCNC: NEGATIVE MG/DL
SP GR UR STRIP: 1.02 (ref 1–1.03)
UROBILINOGEN UR STRIP-ACNC: <2
VIT C UR-MCNC: NEGATIVE MG/DL

## 2018-07-16 PROCEDURE — 99212 OFFICE O/P EST SF 10 MIN: CPT | Performed by: INTERNAL MEDICINE

## 2018-07-16 PROCEDURE — 81003 URINALYSIS AUTO W/O SCOPE: CPT

## 2018-07-16 PROCEDURE — 99214 OFFICE O/P EST MOD 30 MIN: CPT | Performed by: INTERNAL MEDICINE

## 2018-07-16 PROCEDURE — 83935 ASSAY OF URINE OSMOLALITY: CPT

## 2018-07-16 RX ORDER — CARBIDOPA AND LEVODOPA 50; 200 MG/1; MG/1
1 TABLET, EXTENDED RELEASE ORAL NIGHTLY
Qty: 30 TABLET | Refills: 5 | Status: SHIPPED | OUTPATIENT
Start: 2018-07-16 | End: 2018-12-21

## 2018-07-16 RX ORDER — PRAMIPEXOLE DIHYDROCHLORIDE 0.12 MG/1
TABLET ORAL
Qty: 30 TABLET | Refills: 11 | Status: SHIPPED | OUTPATIENT
Start: 2018-07-16 | End: 2018-12-21

## 2018-07-16 NOTE — PROGRESS NOTES
Nancy Miranda is a 29year old male. HPI:   Patient presents with:  Fatigue: Patient present for c/o fatigue and frequenct urination since this morning. Denies pain.        30 y/o M with Restless leg syndrome/PLMD here for F/U; on Mirapex 0.125 mg po qH pressure 110/82, pulse 87, temperature 99 °F (37.2 °C), temperature source Oral, resp. rate 16, height 5' 7.75\" (1.721 m), weight 185 lb (83.9 kg), SpO2 98 %.   Constitutional: alert and oriented x3 in no acute distress  HEENT- EOMI, PERRL  Nose/Mouth/Thro was 343 in Nov 2014; CPM; no recurrent stones; pt stopped HCTZ and Klor-con in May 2018  Hypercholesterolemia     in Feb 2018; advised low chol diet; discussed screening CT calcium score--> order given  Elevated BP  Advise stop alcohol use; had been

## 2018-07-17 LAB — OSMOLALITY UR: 853 MOSM/KG (ref 300–1100)

## 2018-07-27 ENCOUNTER — TELEPHONE (OUTPATIENT)
Dept: INTERNAL MEDICINE CLINIC | Facility: CLINIC | Age: 33
End: 2018-07-27

## 2018-07-27 NOTE — TELEPHONE ENCOUNTER
Patient calling for 7/16/18 lab results. To Dr. Kathia Alvarado to advise in Dr. Zulema Khan absence.

## 2018-08-04 ENCOUNTER — OFFICE VISIT (OUTPATIENT)
Dept: OTOLARYNGOLOGY | Facility: CLINIC | Age: 33
End: 2018-08-04
Payer: COMMERCIAL

## 2018-08-04 VITALS
HEIGHT: 67.75 IN | SYSTOLIC BLOOD PRESSURE: 110 MMHG | BODY MASS INDEX: 28.36 KG/M2 | WEIGHT: 185 LBS | DIASTOLIC BLOOD PRESSURE: 82 MMHG | TEMPERATURE: 98 F

## 2018-08-04 DIAGNOSIS — J34.2 DEVIATED SEPTUM: Primary | ICD-10-CM

## 2018-08-04 PROCEDURE — 99212 OFFICE O/P EST SF 10 MIN: CPT | Performed by: OTOLARYNGOLOGY

## 2018-08-04 PROCEDURE — 99243 OFF/OP CNSLTJ NEW/EST LOW 30: CPT | Performed by: OTOLARYNGOLOGY

## 2018-08-04 NOTE — PROGRESS NOTES
Miguel Wood is a 29year old male. Patient presents with:  Nose Problem: Dificulty breathing     HPI:   He is having difficulty breathing through his nose. He has problems breathing from one side of the other at nighttime.   During the day he does not r nerves - Cranial nerves II through XII grossly intact. Neck Exam Normal Inspection - Normal. Palpation - Normal. Parotid gland - Normal. Thyroid gland - Normal.   Psychiatric Normal Orientation - Oriented to time, place, person & situation.  Appropriate m

## 2018-08-16 ENCOUNTER — TELEPHONE (OUTPATIENT)
Dept: OTOLARYNGOLOGY | Facility: CLINIC | Age: 33
End: 2018-08-16

## 2018-08-28 ENCOUNTER — TELEPHONE (OUTPATIENT)
Dept: ADMINISTRATIVE | Age: 33
End: 2018-08-28

## 2018-08-28 NOTE — TELEPHONE ENCOUNTER
Dr. Annalise Barba,    Please sign off on form:  -Highlight the patient and hit \"Chart\" button. -In Chart Review, w/in the Encounter tab - click 1 time on the Telephone call encounter for 8/28/18.  Scroll down the telephone encounter.  -Click \"scan on\" blue Hyp

## 2018-08-30 NOTE — TELEPHONE ENCOUNTER
Patient gave Dwaine Trinidad consent\" to fax his completed FMLA to himself at [phone]. I emailed him the FCR packet to [email or web address] ( [employer] ) and will be billed $25. He email the signed HIPAA to [facility].  SC

## 2018-09-17 ENCOUNTER — TELEPHONE (OUTPATIENT)
Dept: OTOLARYNGOLOGY | Facility: CLINIC | Age: 33
End: 2018-09-17

## 2018-09-17 NOTE — TELEPHONE ENCOUNTER
Pt has ques re meds - pt has surgery on 9/27 - he took 2 pepto bismal tablets - asking if that is ok

## 2018-09-17 NOTE — TELEPHONE ENCOUNTER
Dr.Doshi AROLDO, please see note below . Advised pt he should be fine to proceed with surgery next week, pepto bismal should be ok.

## 2018-09-21 ENCOUNTER — TELEPHONE (OUTPATIENT)
Dept: OTOLARYNGOLOGY | Facility: CLINIC | Age: 33
End: 2018-09-21

## 2018-09-21 NOTE — TELEPHONE ENCOUNTER
pt rtn your call. Please leave detailed message on his voice mail. Or try after 4:30pm today. Thank you.

## 2018-09-21 NOTE — TELEPHONE ENCOUNTER
Patient would like to know how long is the nose going to be packed for after surgery. Please call. Thank you.

## 2018-09-27 ENCOUNTER — TELEPHONE (OUTPATIENT)
Dept: OTOLARYNGOLOGY | Facility: CLINIC | Age: 33
End: 2018-09-27

## 2018-09-27 RX ORDER — HYDROCODONE BITARTRATE AND ACETAMINOPHEN 5; 325 MG/1; MG/1
TABLET ORAL
Qty: 20 TABLET | Refills: 0 | Status: SHIPPED | OUTPATIENT
Start: 2018-09-27 | End: 2018-12-21

## 2018-09-27 RX ORDER — CEPHALEXIN 500 MG/1
500 CAPSULE ORAL EVERY 8 HOURS
Qty: 21 CAPSULE | Refills: 0 | Status: SHIPPED | OUTPATIENT
Start: 2018-09-27 | End: 2018-12-21

## 2018-09-28 ENCOUNTER — TELEPHONE (OUTPATIENT)
Dept: OTOLARYNGOLOGY | Facility: CLINIC | Age: 33
End: 2018-09-28

## 2018-09-29 NOTE — TELEPHONE ENCOUNTER
Per pt, he has minimal drainage. Pt states the antibiotic he is taking is causing dizziness and disorientation, upset stomach. Pt only Tylenol for pain, rates pain 2-3/10. Pt currently on Keflex 500 mg, took his last dose at 2:30 am today.     Pt states

## 2018-10-04 ENCOUNTER — OFFICE VISIT (OUTPATIENT)
Dept: OTOLARYNGOLOGY | Facility: CLINIC | Age: 33
End: 2018-10-04
Payer: COMMERCIAL

## 2018-10-04 VITALS
HEIGHT: 68 IN | TEMPERATURE: 98 F | DIASTOLIC BLOOD PRESSURE: 84 MMHG | SYSTOLIC BLOOD PRESSURE: 123 MMHG | WEIGHT: 185 LBS | BODY MASS INDEX: 28.04 KG/M2

## 2018-10-04 DIAGNOSIS — J34.2 DEVIATED SEPTUM: Primary | ICD-10-CM

## 2018-10-04 PROCEDURE — 99212 OFFICE O/P EST SF 10 MIN: CPT | Performed by: OTOLARYNGOLOGY

## 2018-10-04 PROCEDURE — 99024 POSTOP FOLLOW-UP VISIT: CPT | Performed by: OTOLARYNGOLOGY

## 2018-10-04 NOTE — PROGRESS NOTES
He has been doing pretty well following his septoplasty and turbinate reduction. His breathing seems somewhat improved but he is still very congested. Exam:  Nasal splints removed and debris cleared from the passages bilaterally. Airway patent.   Breat

## 2018-12-21 ENCOUNTER — OFFICE VISIT (OUTPATIENT)
Dept: INTERNAL MEDICINE CLINIC | Facility: CLINIC | Age: 33
End: 2018-12-21
Payer: COMMERCIAL

## 2018-12-21 ENCOUNTER — TELEPHONE (OUTPATIENT)
Dept: INTERNAL MEDICINE CLINIC | Facility: CLINIC | Age: 33
End: 2018-12-21

## 2018-12-21 VITALS
HEIGHT: 68 IN | SYSTOLIC BLOOD PRESSURE: 106 MMHG | TEMPERATURE: 99 F | WEIGHT: 186 LBS | BODY MASS INDEX: 28.19 KG/M2 | HEART RATE: 88 BPM | DIASTOLIC BLOOD PRESSURE: 70 MMHG | RESPIRATION RATE: 16 BRPM | OXYGEN SATURATION: 98 %

## 2018-12-21 DIAGNOSIS — G25.81 RLS (RESTLESS LEGS SYNDROME): Primary | ICD-10-CM

## 2018-12-21 DIAGNOSIS — E78.00 HYPERCHOLESTEREMIA: ICD-10-CM

## 2018-12-21 DIAGNOSIS — N20.0 NEPHROLITHIASIS: ICD-10-CM

## 2018-12-21 DIAGNOSIS — R35.89 POLYURIA: ICD-10-CM

## 2018-12-21 DIAGNOSIS — F41.9 ANXIETY: ICD-10-CM

## 2018-12-21 PROCEDURE — 99212 OFFICE O/P EST SF 10 MIN: CPT | Performed by: INTERNAL MEDICINE

## 2018-12-21 PROCEDURE — 99214 OFFICE O/P EST MOD 30 MIN: CPT | Performed by: INTERNAL MEDICINE

## 2018-12-21 RX ORDER — ALPRAZOLAM 0.5 MG/1
0.5 TABLET ORAL NIGHTLY PRN
Qty: 30 TABLET | Refills: 3 | Status: SHIPPED
Start: 2018-12-21 | End: 2019-08-29

## 2018-12-21 NOTE — TELEPHONE ENCOUNTER
Pt called stating he is at pharm now and they do not have Rx for alprazolam.   I called Rx to pharmacy

## 2018-12-21 NOTE — PROGRESS NOTES
Marilee Esquivel is a 34year old male. HPI:   Patient presents with:   Follow - Up: pt in for 6 month follow up      35 y/o M with restless leg syndrome, insomnia here for F/U; has stopped all medications; still with poor sleep quality and sleep about 4-5 alert and oriented x3 in no acute distress  HEENT- EOMI, PERRL  Nose/Mouth/Throat: pharynx without erythema; no oral lesions  Neck/Thyroid: neck supple; no thyromegaly  Cardiovascular: RRR, S1, S2, no S3 or murmur  Respiratory: lungs without crackles or wh 2014; CPM; no recurrent stones; pt stopped HCTZ and Klor-con in May 2018  Hypercholesterolemia     in Dec 2018 at outside lab; advised low chol diet  Elevated BP  Advise stop alcohol use; has been drinking 2-3 glasses wine daily  Situational anxiety

## 2019-03-26 ENCOUNTER — OFFICE VISIT (OUTPATIENT)
Dept: INTERNAL MEDICINE CLINIC | Facility: CLINIC | Age: 34
End: 2019-03-26
Payer: COMMERCIAL

## 2019-03-26 VITALS
BODY MASS INDEX: 28.68 KG/M2 | HEART RATE: 86 BPM | WEIGHT: 189.25 LBS | OXYGEN SATURATION: 99 % | SYSTOLIC BLOOD PRESSURE: 126 MMHG | HEIGHT: 68 IN | DIASTOLIC BLOOD PRESSURE: 78 MMHG | TEMPERATURE: 97 F

## 2019-03-26 DIAGNOSIS — M53.3 TAIL BONE PAIN: ICD-10-CM
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
## 2019-03-26 DIAGNOSIS — R35.0 URINE FREQUENCY: ICD-10-CM

## 2019-03-26 PROCEDURE — 99214 OFFICE O/P EST MOD 30 MIN: CPT | Performed by: INTERNAL MEDICINE

## 2019-03-26 PROCEDURE — 99212 OFFICE O/P EST SF 10 MIN: CPT | Performed by: INTERNAL MEDICINE

## 2019-03-26 NOTE — PROGRESS NOTES
Joaquín Meredith is a 34year old male. HPI:   Patient presents with:  Pain: Bruised Tailbone       Patient relates that on March 16 he had his Ms. hand and he was face timing somebody. He was leaning against a couch.   He then slipped and fell onto a woode otherwise  RESPIRATORY:  Voices no shortness of breath with exertion or cough  CARDIOVASCULAR:  Voices no chest pain on exertion or shortness of breath  GI:   Voices no abdominal pain or changes of bowels   :Viices no urning or frequency of urination.   Aracely Buchanan Donell Vilchis MD  3/26/2019  2:19 PM

## 2019-05-23 ENCOUNTER — TELEPHONE (OUTPATIENT)
Dept: INTERNAL MEDICINE CLINIC | Facility: CLINIC | Age: 34
End: 2019-05-23

## 2019-05-23 NOTE — TELEPHONE ENCOUNTER
Pt requests call back from Dr Jimenez Mon  Has a large hemorrhoid of left side that is not going away with preparation h  Anything else pt can use?     Please call back today to cell# [phone]

## 2019-05-24 NOTE — TELEPHONE ENCOUNTER
[facility] faxed a PA for Anucort-hc 25 mg Plan doesn't cover initiate PA   Ins. # [id number]  Ph.  # [phone] placed in [facility] folder  Routed to Rx

## 2019-06-12 ENCOUNTER — TELEPHONE (OUTPATIENT)
Dept: INTERNAL MEDICINE CLINIC | Facility: CLINIC | Age: 34
End: 2019-06-12

## 2019-06-12 DIAGNOSIS — G47.61 PLMD (PERIODIC LIMB MOVEMENT DISORDER): ICD-10-CM

## 2019-06-12 DIAGNOSIS — G47.33 OSA (OBSTRUCTIVE SLEEP APNEA): Primary | ICD-10-CM

## 2019-06-12 NOTE — TELEPHONE ENCOUNTER
Pt is calling to get an order for a sleep study  His last one was in 8/16, pt feels he has sleep apnea  He wakes in the night and is not breathing right through his nose  He has tried numerous sleep medication's but still doesn't get a full night sleep  He

## 2019-06-13 NOTE — TELEPHONE ENCOUNTER
Order placed for sleep study; study facility at [address] [place] [address] in [place]; he may [phone] to schedule; please call pt

## 2019-08-29 ENCOUNTER — OFFICE VISIT (OUTPATIENT)
Dept: INTERNAL MEDICINE CLINIC | Facility: CLINIC | Age: 34
End: 2019-08-29
Payer: COMMERCIAL

## 2019-08-29 VITALS
HEIGHT: 68 IN | HEART RATE: 72 BPM | OXYGEN SATURATION: 97 % | DIASTOLIC BLOOD PRESSURE: 108 MMHG | TEMPERATURE: 98 F | BODY MASS INDEX: 28.49 KG/M2 | WEIGHT: 188 LBS | SYSTOLIC BLOOD PRESSURE: 132 MMHG

## 2019-08-29 DIAGNOSIS — F32.A DEPRESSION, UNSPECIFIED DEPRESSION TYPE: Primary | ICD-10-CM

## 2019-08-29 DIAGNOSIS — E78.00 HYPERCHOLESTEREMIA: ICD-10-CM

## 2019-08-29 DIAGNOSIS — R03.0 ELEVATED BP WITHOUT DIAGNOSIS OF HYPERTENSION: ICD-10-CM

## 2019-08-29 DIAGNOSIS — G47.61 PLMD (PERIODIC LIMB MOVEMENT DISORDER): ICD-10-CM

## 2019-08-29 PROCEDURE — 99214 OFFICE O/P EST MOD 30 MIN: CPT | Performed by: INTERNAL MEDICINE

## 2019-08-29 RX ORDER — BUPROPION HYDROCHLORIDE 150 MG/1
150 TABLET, EXTENDED RELEASE ORAL 2 TIMES DAILY
Qty: 60 TABLET | Refills: 5 | Status: SHIPPED | OUTPATIENT
Start: 2019-08-29 | End: 2021-10-07

## 2019-08-29 NOTE — PROGRESS NOTES
Nadiya Torres is a 30year old male.     HPI:   Patient presents with:  Depression: x 3 days       29 y/o M with h/o depression here with c/o depressed mood x 3d; he has had stress at work and has been concerned about work performance; last took anti-depre kg), SpO2 97 %.   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

## 2019-11-17 ENCOUNTER — HOSPITAL ENCOUNTER (EMERGENCY)
Facility: HOSPITAL | Age: 34
Discharge: HOME | End: 2019-11-18
Attending: EMERGENCY MEDICINE
Payer: COMMERCIAL

## 2019-11-17 DIAGNOSIS — K59.00 CONSTIPATION, UNSPECIFIED CONSTIPATION TYPE: ICD-10-CM

## 2019-11-17 DIAGNOSIS — R10.12 ABDOMINAL PAIN, LEFT UPPER QUADRANT: Primary | ICD-10-CM

## 2019-11-17 PROCEDURE — 99284 EMERGENCY DEPT VISIT MOD MDM: CPT

## 2019-11-17 PROCEDURE — 96374 THER/PROPH/DIAG INJ IV PUSH: CPT

## 2019-11-17 PROCEDURE — 96372 THER/PROPH/DIAG INJ SC/IM: CPT

## 2019-11-17 PROCEDURE — 80320 DRUG SCREEN QUANTALCOHOLS: CPT | Performed by: EMERGENCY MEDICINE

## 2019-11-17 PROCEDURE — 83690 ASSAY OF LIPASE: CPT | Performed by: EMERGENCY MEDICINE

## 2019-11-17 PROCEDURE — 80053 COMPREHEN METABOLIC PANEL: CPT | Performed by: EMERGENCY MEDICINE

## 2019-11-17 PROCEDURE — 85025 COMPLETE CBC W/AUTO DIFF WBC: CPT | Performed by: EMERGENCY MEDICINE

## 2019-11-17 RX ORDER — LIDOCAINE HYDROCHLORIDE 20 MG/ML
10 SOLUTION OROPHARYNGEAL ONCE
Status: COMPLETED | OUTPATIENT
Start: 2019-11-17 | End: 2019-11-18

## 2019-11-17 RX ORDER — MAGNESIUM HYDROXIDE/ALUMINUM HYDROXICE/SIMETHICONE 120; 1200; 1200 MG/30ML; MG/30ML; MG/30ML
30 SUSPENSION ORAL ONCE
Status: COMPLETED | OUTPATIENT
Start: 2019-11-17 | End: 2019-11-18

## 2019-11-17 RX ORDER — KETOROLAC TROMETHAMINE 30 MG/ML
30 INJECTION, SOLUTION INTRAMUSCULAR; INTRAVENOUS ONCE
Status: COMPLETED | OUTPATIENT
Start: 2019-11-17 | End: 2019-11-18

## 2019-11-18 ENCOUNTER — APPOINTMENT (OUTPATIENT)
Dept: CT IMAGING | Facility: HOSPITAL | Age: 34
End: 2019-11-18
Attending: EMERGENCY MEDICINE
Payer: COMMERCIAL

## 2019-11-18 VITALS
DIASTOLIC BLOOD PRESSURE: 90 MMHG | WEIGHT: 190 LBS | OXYGEN SATURATION: 97 % | TEMPERATURE: 97 F | SYSTOLIC BLOOD PRESSURE: 140 MMHG | BODY MASS INDEX: 28.79 KG/M2 | HEART RATE: 68 BPM | RESPIRATION RATE: 21 BRPM | HEIGHT: 68 IN

## 2019-11-18 PROCEDURE — 74176 CT ABD & PELVIS W/O CONTRAST: CPT | Performed by: EMERGENCY MEDICINE

## 2019-11-18 RX ORDER — DICYCLOMINE HCL 20 MG
20 TABLET ORAL 4 TIMES DAILY PRN
Qty: 30 TABLET | Refills: 0 | Status: SHIPPED | OUTPATIENT
Start: 2019-11-18 | End: 2019-11-27

## 2019-11-18 RX ORDER — DICYCLOMINE HYDROCHLORIDE 10 MG/ML
20 INJECTION INTRAMUSCULAR ONCE
Status: COMPLETED | OUTPATIENT
Start: 2019-11-18 | End: 2019-11-18

## 2019-11-18 RX ORDER — PANTOPRAZOLE SODIUM 40 MG/1
40 TABLET, DELAYED RELEASE ORAL DAILY
Qty: 30 TABLET | Refills: 0 | Status: SHIPPED | OUTPATIENT
Start: 2019-11-18 | End: 2019-11-27

## 2019-11-18 NOTE — ED PROVIDER NOTES
Patient Seen in: [facility] Emergency Department      History   Patient presents with:  Abdomen/Flank Pain (GI/)    Stated Complaint: LUQ pain    HPI    30 old male presents to the emergency department with complaints of left upper quadrant pain. O2 Device None (Room air)       Current:/90   Pulse 68   Temp 97.4 °F (36.3 °C) (Temporal)   Resp 21   Ht 172.7 cm (5' 8\")   Wt 86.2 kg   SpO2 97%   BMI 28.89 kg/m²         Physical Exam  Vitals signs and nursing note reviewed.  Chaperone present: orders were created for panel order CBC WITH DIFFERENTIAL WITH PLATELET.   Procedure                               Abnormality         Status                     ---------                               -----------         ------                     CBC W/ D as soon as possible for a visit          Medications Prescribed:  Current Discharge Medication List    START taking these medications    Dicyclomine HCl 20 MG Oral Tab  Take 1 tablet (20 mg total) by mouth 4 (four) times daily as needed.   Qty: 30 tablet Re

## 2019-11-27 ENCOUNTER — OFFICE VISIT (OUTPATIENT)
Dept: INTERNAL MEDICINE CLINIC | Facility: CLINIC | Age: 34
End: 2019-11-27
Payer: COMMERCIAL

## 2019-11-27 VITALS
TEMPERATURE: 98 F | WEIGHT: 190 LBS | HEART RATE: 76 BPM | SYSTOLIC BLOOD PRESSURE: 134 MMHG | OXYGEN SATURATION: 97 % | BODY MASS INDEX: 29 KG/M2 | DIASTOLIC BLOOD PRESSURE: 88 MMHG

## 2019-11-27 DIAGNOSIS — F41.9 ANXIETY: ICD-10-CM

## 2019-11-27 DIAGNOSIS — R53.83 FATIGUE, UNSPECIFIED TYPE: Primary | ICD-10-CM

## 2019-11-27 DIAGNOSIS — R41.3 MEMORY PROBLEM: ICD-10-CM

## 2019-11-27 DIAGNOSIS — F43.9 STRESS: ICD-10-CM

## 2019-11-27 PROCEDURE — 99214 OFFICE O/P EST MOD 30 MIN: CPT | Performed by: INTERNAL MEDICINE

## 2019-11-27 RX ORDER — LORAZEPAM 0.5 MG/1
0.5 TABLET ORAL 2 TIMES DAILY PRN
Qty: 30 TABLET | Refills: 3 | Status: SHIPPED | OUTPATIENT
Start: 2019-11-27 | End: 2021-10-07

## 2019-11-27 NOTE — PROGRESS NOTES
Garrett Gunn is a 30year old male. Patient presents with:  Memory Loss: Patient feels he has memory loss while at work. He will do tasks and then not remember doing them. This has been going on for a couple months.  He does have a headache today from jaymie which included a CBC and CMP which turned out normal.  He also noted that he had this prescribed 2 medications that he never took. Patient feels that his memory is good at other times. Patient has no complaints of headache, vision problems or dizziness. auscultation  CARDIO: RRR, normal S1S2, without murmur   GI:Protuberant, BS are present, no organomegaly or palpable masses  EXTREMITIES: no edema. All peripheral pulses are intact. NEURO: alert and oriented. Cranial nerves are intact.   Deep tendon refl

## 2019-11-27 NOTE — PATIENT INSTRUCTIONS
1.  Patient is to continue his current diet, medications and activity. 2.  I have told the patient that I think his problem with his memory at this time is related to his stress and anxiety. 3.  Patient has been advised to avoid alcohol entirely.   Kathy

## 2020-01-03 ENCOUNTER — TELEPHONE (OUTPATIENT)
Dept: INTERNAL MEDICINE CLINIC | Facility: CLINIC | Age: 35
End: 2020-01-03

## 2020-01-03 RX ORDER — AMOXICILLIN AND CLAVULANATE POTASSIUM 875; 125 MG/1; MG/1
1 TABLET, FILM COATED ORAL 2 TIMES DAILY
Qty: 20 TABLET | Refills: 0 | Status: SHIPPED | OUTPATIENT
Start: 2020-01-03 | End: 2020-01-13

## 2020-01-03 NOTE — TELEPHONE ENCOUNTER
To Dr. Angel Frederick---    Spoke to patient who reports 12/27/19 he developed nasal congestion and severe headache. Since then has developed runny nose, dry non-productive cough, sore throat. Denies fever, SOB, wheezing.     Pt has tried using Mucinex DM and Robitussin

## 2020-01-03 NOTE — TELEPHONE ENCOUNTER
Imp- URI; Rec- Augmentin 875 mg po BID #20; ERx sent to [facility] in [place]; please call pt

## 2020-01-03 NOTE — TELEPHONE ENCOUNTER
Please call pt, has cold that he can't seem to shake, has had since about 12/27/19  Over the counter medication not working  Cough, sore throat, stuffy nose  Pt was hoping to be seen, no openings  Pt uses [facility] as pharmacy  Tasked to nursing

## 2020-07-31 ENCOUNTER — TELEPHONE (OUTPATIENT)
Dept: INTERNAL MEDICINE CLINIC | Facility: CLINIC | Age: 35
End: 2020-07-31

## 2020-07-31 NOTE — TELEPHONE ENCOUNTER
Patient was exposed to a coworker who tested positive for covid    Patient is not having symptoms, the only thing he feels is stomach tightness he did eat heavy today    Should the patient get tested?     Please call patient [phone] to wait for Dr Mallorie Claire on Monday

## 2020-07-31 NOTE — TELEPHONE ENCOUNTER
Spoke to patient who reports he had contact with a coworker on 7/27, he was within 6 feet but it was behind a plastic barrier. The coworker did not appear to have symptoms at the time of contact but he heard he had symptoms on 7/26 and the coworker ended up calling in on 7/28 and was told to get tested. He tested positive on 7/30. Patient states he has some \"stomach tightness\" that comes and goes. He denies abdominal pain, no N/V/D. He is able to eat and drink normally. He notes he has eaten heavy the last few days. He states he does have a \"typical dry cough\" here and there. He denies night sweats or chills but did feel warm in his office. He notes he is on the 3rd floor of the office and it has been very hot outside, he does not think he had a fever. He also mentions he felt flushed walking up the stairs of his condo, but now he is walking around like normal and feels ok. Advised patient he can go to [facility] or [facility] for testing. He states he does work at [employer] and would probably be able to go there tomorrow. I suggested  so he can also be evaluated for his symptoms. He thinks he will go with the [facility] so he can get tested, he was planning on going back to work on Monday. Advised if symptoms worsen he definitely should go to , suggested UC in [facility] or [place]. Advised patient he should quarantine and keep distance of at least 6 feet from all other people. Advised to check temps and monitor for fever. Advised if he feels more out of breath with regular activity or if he cannot walk from room to room without feeling SOB he should go to ER for eval. He verbalizes understanding. TO Dr. Chidi Charles for any other recommendations, thanks!

## 2020-07-31 NOTE — TELEPHONE ENCOUNTER
To nursing, I  agree with advice given. Tell him 14 days quarantine from time he was last with the coworker. Thanks. Routed to Dr. Dario Sierra as [facility]. Addendum--I called pt (triage staff gone for the day)--His contact with the [employer] coworker was that he was exchanging mail with this coworker. His last contact was 7/27/20. Discussed recom to isolate for 14 days from his last contact with him. Pt feels his stomach sx is acid reflux and he has prilosec. He will call if not better. Pt expr understanding.

## 2020-08-26 ENCOUNTER — TELEPHONE (OUTPATIENT)
Dept: INTERNAL MEDICINE CLINIC | Facility: CLINIC | Age: 35
End: 2020-08-26

## 2020-08-26 NOTE — TELEPHONE ENCOUNTER
Pt's roommate has a sore throat, was tested for covid & is quarantining until he gets his results    Please call patient to advise what he should be doing    - Does not have any symptoms at this time    [phone]

## 2020-08-26 NOTE — TELEPHONE ENCOUNTER
I spoke with Armida Patino and he says his room was tested for coronavirus today and is awaiting the results. Roommate was tested and he is under quarantine in their condo. Armida Patino has a separate bathroom and separate bedroom. They share the kitchen and living area.  Armida Patino

## 2020-09-28 ENCOUNTER — TELEPHONE (OUTPATIENT)
Dept: INTERNAL MEDICINE CLINIC | Facility: CLINIC | Age: 35
End: 2020-09-28

## 2020-09-28 NOTE — TELEPHONE ENCOUNTER
Patient is calling to leave a message with Dr Jody Salcedo. Patient is looking for some recommendation on taking an iron pill to help with restless legs. Patient states it was diagnosed in 2016 that he has restless legs but it seems to be getting worse.      Be

## 2020-09-28 NOTE — TELEPHONE ENCOUNTER
To Dr. Jan Shukla----    Spoke to patient who reports his RLS has been worsening. States he read online that iron tablets can help relax the muscles and improve symptoms. He is inquiring if you can recommend iron supplement to help RLS?      Pt aware MD out of offic

## 2020-10-08 ENCOUNTER — TELEPHONE (OUTPATIENT)
Dept: INTERNAL MEDICINE CLINIC | Facility: CLINIC | Age: 35
End: 2020-10-08

## 2020-10-08 NOTE — TELEPHONE ENCOUNTER
Had diarrhea 2 d ago; +sweats; +diarrhea x 3 today; +nausea; left work this morning;      Imp- nares COVID advised; he will pursue testing at [facility]    Pt called; he verbalized understanding

## 2020-10-08 NOTE — TELEPHONE ENCOUNTER
Please call pt, hoping to be seen today  3rd day with diarrhea and upset stomach yesterday  no fever, no cough  Ok for pt to be seen in office?   Tasked to nursing

## 2020-10-08 NOTE — TELEPHONE ENCOUNTER
To Dr. Martinez Corbett----    Pt reports he has had nausea and diarrhea since Monday night (10/5). Reports appetite has also been decreased. Does not recall eating anything spoiled over the weekend. Did not go out to eat over the weekend.  Denies any sick contact or COVI

## 2020-10-08 NOTE — TELEPHONE ENCOUNTER
Patient is calling to speak to Dr Libby Neves  He took his temp this morning and it was 97.5, he bought a thermometer at [employer] this morning    Patient said he doesn't want to pursue Covid testing  He would like to discuss with Dr Libby Neves, please call [phone]

## 2021-04-12 ENCOUNTER — APPOINTMENT (OUTPATIENT)
Dept: GENERAL RADIOLOGY | Age: 36
End: 2021-04-12
Attending: PHYSICIAN ASSISTANT
Payer: COMMERCIAL

## 2021-04-12 ENCOUNTER — HOSPITAL ENCOUNTER (OUTPATIENT)
Age: 36
Discharge: HOME | End: 2021-04-12
Payer: COMMERCIAL

## 2021-04-12 VITALS
SYSTOLIC BLOOD PRESSURE: 136 MMHG | OXYGEN SATURATION: 96 % | DIASTOLIC BLOOD PRESSURE: 85 MMHG | RESPIRATION RATE: 18 BRPM | BODY MASS INDEX: 29 KG/M2 | TEMPERATURE: 97 F | WEIGHT: 190.06 LBS | HEART RATE: 71 BPM

## 2021-04-12 DIAGNOSIS — M25.511 ACUTE PAIN OF RIGHT SHOULDER: Primary | ICD-10-CM

## 2021-04-12 PROCEDURE — 73030 X-RAY EXAM OF SHOULDER: CPT | Performed by: PHYSICIAN ASSISTANT

## 2021-04-12 PROCEDURE — 99202 OFFICE O/P NEW SF 15 MIN: CPT | Performed by: PHYSICIAN ASSISTANT

## 2021-04-12 RX ORDER — IBUPROFEN 600 MG/1
600 TABLET ORAL EVERY 8 HOURS PRN
Qty: 15 TABLET | Refills: 0 | Status: SHIPPED | OUTPATIENT
Start: 2021-04-12 | End: 2021-04-17

## 2021-04-12 RX ORDER — CYCLOBENZAPRINE HCL 10 MG
10 TABLET ORAL 3 TIMES DAILY PRN
Qty: 20 TABLET | Refills: 0 | Status: SHIPPED | OUTPATIENT
Start: 2021-04-12 | End: 2021-04-19

## 2021-04-12 NOTE — ED INITIAL ASSESSMENT (HPI)
No known injury, pain to r shoulder/scapula x 4 days. Worse w weight bearing & tender to touch. No relief w advil 400 mg q 4 hours. XCMS intact.

## 2021-04-12 NOTE — ED PROVIDER NOTES
Patient Seen in: Immediate [facility]      History   Patient presents with:  Pain: right shoulder/scapula    Stated Complaint: Right Shoulder Pain    HPI/Subjective:   HPI    14-year-old male presents to the immediate care for evaluation of ri Rate and Rhythm: Normal rate and regular rhythm. Pulmonary:      Effort: Pulmonary effort is normal.      Breath sounds: Normal breath sounds. Abdominal:      Palpations: Abdomen is soft.    Musculoskeletal:      Right shoulder: Tenderness (trapezius) Impression:  Acute pain of right shoulder  (primary encounter diagnosis)     Disposition:  Discharge  4/12/2021  6:23 pm    Follow-up:  Carley Miles [facility] [address] [phone]    Schedule an appointment as soon

## 2021-05-21 ENCOUNTER — VIRTUAL PHONE E/M (OUTPATIENT)
Dept: INTERNAL MEDICINE CLINIC | Facility: CLINIC | Age: 36
End: 2021-05-21
Payer: COMMERCIAL

## 2021-05-21 ENCOUNTER — LAB ENCOUNTER (OUTPATIENT)
Dept: LAB | Facility: HOSPITAL | Age: 36
End: 2021-05-21
Attending: INTERNAL MEDICINE
Payer: COMMERCIAL

## 2021-05-21 ENCOUNTER — TELEPHONE (OUTPATIENT)
Dept: INTERNAL MEDICINE CLINIC | Facility: CLINIC | Age: 36
End: 2021-05-21

## 2021-05-21 DIAGNOSIS — R05.9 COUGH: Primary | ICD-10-CM

## 2021-05-21 DIAGNOSIS — R05.9 COUGH: ICD-10-CM

## 2021-05-21 DIAGNOSIS — J06.9 UPPER RESPIRATORY TRACT INFECTION, UNSPECIFIED TYPE: ICD-10-CM

## 2021-05-21 DIAGNOSIS — R09.81 SINUS CONGESTION: ICD-10-CM

## 2021-05-21 PROCEDURE — 99213 OFFICE O/P EST LOW 20 MIN: CPT | Performed by: INTERNAL MEDICINE

## 2021-05-21 RX ORDER — PROMETHAZINE HYDROCHLORIDE AND CODEINE PHOSPHATE 6.25; 1 MG/5ML; MG/5ML
5 SYRUP ORAL EVERY 6 HOURS PRN
Qty: 180 ML | Refills: 0 | Status: SHIPPED | OUTPATIENT
Start: 2021-05-21 | End: 2021-05-21

## 2021-05-21 RX ORDER — PROMETHAZINE HYDROCHLORIDE AND CODEINE PHOSPHATE 6.25; 1 MG/5ML; MG/5ML
5 SYRUP ORAL EVERY 6 HOURS PRN
Qty: 180 ML | Refills: 0 | Status: SHIPPED | OUTPATIENT
Start: 2021-05-21 | End: 2021-10-07

## 2021-05-21 NOTE — TELEPHONE ENCOUNTER
Per Di Miles, Dr. Ria Joseph advises this be changed to a phone visit. Discussed with patient MD recommendations, he is ok with this. Informed patient phone visits are billed through insurance like regular visits, he understands. Appt changed in Epic.

## 2021-05-21 NOTE — TELEPHONE ENCOUNTER
ERx sent to [facility] in [place] for Phenergan with codeine 5 mL po q6hrs prn cough.  #180 mL; pt called

## 2021-05-21 NOTE — TELEPHONE ENCOUNTER
Please call pt, he just had telephone appt with Dr Abel Dela Cruz  He is being told by pharmacy that medication is out of stock at [facility] needed if being sent to different [facility]  They could not tell him if another pharmacy had medication in

## 2021-05-21 NOTE — TELEPHONE ENCOUNTER
Patient calling, made appointment for today with Dr. Mati Pino for 3:15. Patient has symptoms of runny nose, cough, chills and body aches. Tested negative for Covid on Wednesday. Please advise if okay for patient to be seen in office.

## 2021-05-21 NOTE — TELEPHONE ENCOUNTER
Spoke with patient who reports his symptoms started on late Tuesday and he was tested for COVID on Wednesday at [facility] in [facility]. He confirms symptoms below. He denies any COVID contacts. He denies any fevers, wheezing, nausea, vomiting, diarrhea, SOB.

## 2021-05-21 NOTE — TELEPHONE ENCOUNTER
Spoke with [facility] in [place]; they do not have promethazine with codeine, are unsure of which pharmacy would have, maybe [facility]. Pharmacist would suggest OTC Robitussin-DM.      Spoke with patient- he can go to [facility] or [facility] in [place]

## 2021-06-23 ENCOUNTER — TELEPHONE (OUTPATIENT)
Dept: INTERNAL MEDICINE CLINIC | Facility: CLINIC | Age: 36
End: 2021-06-23

## 2021-06-23 NOTE — TELEPHONE ENCOUNTER
Patient calling for advise. Since June 21 patient gets shortness of breath while talking or singing. He has had to cough to be able to get breath back. Once he coughs feels back to normal. No other symptoms. Can walk and run without shortness of breath. Monday was exercising in pool Monday. No issues with breathing. Fully covid vaccinated as of June 18th  Patient is asking if this could be a side effect of covid vaccine.     Best call back number [phone]

## 2021-06-23 NOTE — TELEPHONE ENCOUNTER
I spoke with patient. He has felt that he has to suddenly cough to catch his breath. This has been going on several times a day since Monday after he was in the pool. He says this feels random and sudden. It does resolve after he coughs. He says it happens while talking or singing. He asks if this is a side effect of Pfizer vaccines. He had his second shot on 6/4/21. He had body aches and flu like symptoms on 6/5 and 6/6. He denied any shortness of breath, no difficulty breathing right now. He is not in distress. No runny nose. He may have some post nasal drip and feels phlegm in his throat. He is not sure if the cough from May totally resolved. He did take his cough syrup again last night. He asks if he should add on Mucinex. He denied any dizziness or lightheadedness. No nausea, no vomiting or diarrhea. No fever or chills. Advised ER evaluation if he has shortness of breath or difficulty breathing. He verbalized understanding. To Dr. Carlyn Conteh, please advise.

## 2021-10-07 ENCOUNTER — OFFICE VISIT (OUTPATIENT)
Dept: INTERNAL MEDICINE CLINIC | Facility: CLINIC | Age: 36
End: 2021-10-07
Payer: COMMERCIAL

## 2021-10-07 VITALS
HEIGHT: 68 IN | OXYGEN SATURATION: 97 % | HEART RATE: 86 BPM | WEIGHT: 185 LBS | DIASTOLIC BLOOD PRESSURE: 80 MMHG | SYSTOLIC BLOOD PRESSURE: 122 MMHG | TEMPERATURE: 98 F | BODY MASS INDEX: 28.04 KG/M2

## 2021-10-07 DIAGNOSIS — G47.61 PLMD (PERIODIC LIMB MOVEMENT DISORDER): ICD-10-CM

## 2021-10-07 DIAGNOSIS — M72.2 PLANTAR FASCIITIS OF RIGHT FOOT: Primary | ICD-10-CM

## 2021-10-07 PROCEDURE — 3008F BODY MASS INDEX DOCD: CPT | Performed by: INTERNAL MEDICINE

## 2021-10-07 PROCEDURE — 99214 OFFICE O/P EST MOD 30 MIN: CPT | Performed by: INTERNAL MEDICINE

## 2021-10-07 PROCEDURE — 3079F DIAST BP 80-89 MM HG: CPT | Performed by: INTERNAL MEDICINE

## 2021-10-07 PROCEDURE — 3074F SYST BP LT 130 MM HG: CPT | Performed by: INTERNAL MEDICINE

## 2021-10-07 RX ORDER — DIVALPROEX SODIUM 500 MG/1
1000 TABLET, EXTENDED RELEASE ORAL NIGHTLY
Qty: 60 TABLET | Refills: 3 | Status: SHIPPED | OUTPATIENT
Start: 2021-10-07

## 2021-10-07 NOTE — PROGRESS NOTES
Altaf Sánchez is a 31year old male. HPI:   Patient presents with:   Foot Pain: pt here due to right foot pain x 1 month       30 y/o right heel pain x several weeks; pain with weight-bearing; no trauma; localized to forefoot  Still having trouble sleep %.  Constitutional: alert and oriented x3 in no acute distress  HEENT- EOMI, PERRL  Nose/Mouth/Throat: pharynx without erythema; no oral lesions  Neck/Thyroid: neck supple; no thyromegaly  Cardiovascular: RRR, S1, S2, no S3 or murmur  Respiratory: lungs wi no caffeine; drinks 100 oz of water during night-time; +nocturia  x1-2; advised trial decrease fluid intake to 48 oz per day     Rhinitis  Trial Flonase NS 2 sp EN qD     Nephrolithiasis  due to hypercalciuria; had been on HCTZ 50 mg po qD and Klor-con 20

## 2022-02-17 ENCOUNTER — OFFICE VISIT (OUTPATIENT)
Dept: INTERNAL MEDICINE CLINIC | Facility: CLINIC | Age: 37
End: 2022-02-17
Payer: COMMERCIAL

## 2022-02-17 ENCOUNTER — TELEPHONE (OUTPATIENT)
Dept: INTERNAL MEDICINE CLINIC | Facility: CLINIC | Age: 37
End: 2022-02-17

## 2022-02-17 VITALS
TEMPERATURE: 98 F | OXYGEN SATURATION: 97 % | DIASTOLIC BLOOD PRESSURE: 88 MMHG | RESPIRATION RATE: 16 BRPM | HEIGHT: 68 IN | SYSTOLIC BLOOD PRESSURE: 126 MMHG | HEART RATE: 82 BPM | BODY MASS INDEX: 28.7 KG/M2 | WEIGHT: 189.38 LBS

## 2022-02-17 DIAGNOSIS — J06.9 URI, ACUTE: Primary | ICD-10-CM

## 2022-02-17 PROCEDURE — 3008F BODY MASS INDEX DOCD: CPT | Performed by: INTERNAL MEDICINE

## 2022-02-17 PROCEDURE — 3079F DIAST BP 80-89 MM HG: CPT | Performed by: INTERNAL MEDICINE

## 2022-02-17 PROCEDURE — 3074F SYST BP LT 130 MM HG: CPT | Performed by: INTERNAL MEDICINE

## 2022-02-17 PROCEDURE — 99213 OFFICE O/P EST LOW 20 MIN: CPT | Performed by: INTERNAL MEDICINE

## 2022-02-17 RX ORDER — CEFUROXIME AXETIL 500 MG/1
500 TABLET ORAL 2 TIMES DAILY
Qty: 20 TABLET | Refills: 0 | Status: SHIPPED | OUTPATIENT
Start: 2022-02-17

## 2022-02-17 RX ORDER — NABUMETONE 750 MG/1
1500 TABLET, FILM COATED ORAL 2 TIMES DAILY
COMMUNITY
Start: 2022-01-18

## 2022-02-17 NOTE — TELEPHONE ENCOUNTER
Pt C/O non productive, dry cough and fatigue started 2/14. Awakened 2/15 with sore throat, taking, Cepaol loznges with minimal relief. Negative PCR test on 2/14. DENIES: fever, SOB, wheezing, chest discomfort, nausea, vomiting, GI discomfort, fatigue, chills, loss of smell or taste. Did not have Strep test. + Covid vax, no booster.  Ok to com to office per Dr Amada Conti

## 2022-02-23 ENCOUNTER — TELEPHONE (OUTPATIENT)
Dept: INTERNAL MEDICINE CLINIC | Facility: CLINIC | Age: 37
End: 2022-02-23

## 2022-02-23 NOTE — TELEPHONE ENCOUNTER
Pt called to be sure fax rec'd   When complete please fax to number provided by patient on cover sheet

## 2022-02-23 NOTE — TELEPHONE ENCOUNTER
Received [facility] paperwork via fax from [address] Need to be returned by 3/4/2022. Emailed to forms department and original in forms bin at [facility].

## 2022-03-24 ENCOUNTER — OFFICE VISIT (OUTPATIENT)
Dept: PODIATRY CLINIC | Facility: CLINIC | Age: 37
End: 2022-03-24
Payer: COMMERCIAL

## 2022-03-24 DIAGNOSIS — M72.2 PLANTAR FASCIITIS OF RIGHT FOOT: Primary | ICD-10-CM

## 2022-03-24 PROCEDURE — 99203 OFFICE O/P NEW LOW 30 MIN: CPT | Performed by: PODIATRIST

## 2022-03-24 RX ORDER — METHYLPREDNISOLONE 4 MG/1
TABLET ORAL
Qty: 21 TABLET | Refills: 0 | Status: SHIPPED | OUTPATIENT
Start: 2022-03-24

## 2022-04-01 ENCOUNTER — TELEPHONE (OUTPATIENT)
Dept: PODIATRY CLINIC | Facility: CLINIC | Age: 37
End: 2022-04-01

## 2022-04-07 ENCOUNTER — OFFICE VISIT (OUTPATIENT)
Dept: PODIATRY CLINIC | Facility: CLINIC | Age: 37
End: 2022-04-07
Payer: COMMERCIAL

## 2022-04-07 DIAGNOSIS — M72.2 PLANTAR FASCIITIS OF RIGHT FOOT: Primary | ICD-10-CM

## 2022-04-07 PROCEDURE — 99213 OFFICE O/P EST LOW 20 MIN: CPT | Performed by: PODIATRIST

## 2022-04-28 ENCOUNTER — OFFICE VISIT (OUTPATIENT)
Dept: PODIATRY CLINIC | Facility: CLINIC | Age: 37
End: 2022-04-28
Payer: COMMERCIAL

## 2022-04-28 VITALS — HEART RATE: 85 BPM | DIASTOLIC BLOOD PRESSURE: 88 MMHG | SYSTOLIC BLOOD PRESSURE: 132 MMHG

## 2022-04-28 DIAGNOSIS — M72.2 PLANTAR FASCIITIS OF RIGHT FOOT: Primary | ICD-10-CM

## 2022-04-28 PROCEDURE — 3075F SYST BP GE 130 - 139MM HG: CPT | Performed by: PODIATRIST

## 2022-04-28 PROCEDURE — 20550 NJX 1 TENDON SHEATH/LIGAMENT: CPT | Performed by: PODIATRIST

## 2022-04-28 PROCEDURE — 3079F DIAST BP 80-89 MM HG: CPT | Performed by: PODIATRIST

## 2022-04-28 RX ORDER — METHYLPREDNISOLONE ACETATE 80 MG/ML
20 INJECTION, SUSPENSION INTRA-ARTICULAR; INTRALESIONAL; INTRAMUSCULAR; SOFT TISSUE ONCE
Status: COMPLETED | OUTPATIENT
Start: 2022-04-28 | End: 2022-04-28

## 2022-04-28 RX ADMIN — METHYLPREDNISOLONE ACETATE 20 MG: 80 INJECTION, SUSPENSION INTRA-ARTICULAR; INTRALESIONAL; INTRAMUSCULAR; SOFT TISSUE at 15:46:00

## 2022-04-28 NOTE — PROGRESS NOTES
Per verbal order from Dr. Elton Stephenson, draw up 0.5ml of 0.5% Marcaine and 20 mg of Depo-Medrol for cortisone injection to right heel [facility]  Patient provided education handout for cortisone injection.

## 2022-05-04 ENCOUNTER — OFFICE VISIT (OUTPATIENT)
Dept: PODIATRY CLINIC | Facility: CLINIC | Age: 37
End: 2022-05-04
Payer: COMMERCIAL

## 2022-05-04 VITALS — HEART RATE: 67 BPM | DIASTOLIC BLOOD PRESSURE: 86 MMHG | SYSTOLIC BLOOD PRESSURE: 127 MMHG

## 2022-05-04 DIAGNOSIS — M72.2 PLANTAR FASCIITIS OF LEFT FOOT: Primary | ICD-10-CM

## 2022-05-04 PROCEDURE — 3074F SYST BP LT 130 MM HG: CPT | Performed by: PODIATRIST

## 2022-05-04 PROCEDURE — 20550 NJX 1 TENDON SHEATH/LIGAMENT: CPT | Performed by: PODIATRIST

## 2022-05-04 PROCEDURE — 3079F DIAST BP 80-89 MM HG: CPT | Performed by: PODIATRIST

## 2022-05-04 RX ORDER — METHYLPREDNISOLONE ACETATE 80 MG/ML
20 INJECTION, SUSPENSION INTRA-ARTICULAR; INTRALESIONAL; INTRAMUSCULAR; SOFT TISSUE ONCE
Status: COMPLETED | OUTPATIENT
Start: 2022-05-04 | End: 2022-05-04

## 2022-05-04 RX ADMIN — METHYLPREDNISOLONE ACETATE 20 MG: 80 INJECTION, SUSPENSION INTRA-ARTICULAR; INTRALESIONAL; INTRAMUSCULAR; SOFT TISSUE at 16:50:00

## 2022-05-04 NOTE — PROGRESS NOTES
Per verbal order from Dr. Jacobo Hall, draw up 0.5ml of 0.5% Marcaine and 20 mg of Depo-Medrol for cortisone injection to left  Heel. Rimma Orourke  Patient provided education handout for cortisone injection.

## 2022-07-14 ENCOUNTER — OFFICE VISIT (OUTPATIENT)
Dept: PODIATRY CLINIC | Facility: CLINIC | Age: 37
End: 2022-07-14
Payer: COMMERCIAL

## 2022-07-14 DIAGNOSIS — M72.2 PLANTAR FASCIITIS OF LEFT FOOT: Primary | ICD-10-CM

## 2022-07-14 DIAGNOSIS — M72.2 PLANTAR FASCIITIS OF RIGHT FOOT: ICD-10-CM

## 2022-07-14 PROCEDURE — 99213 OFFICE O/P EST LOW 20 MIN: CPT | Performed by: PODIATRIST

## 2022-07-14 PROCEDURE — L3060 FOOT ARCH SUPP LONGITUD/META: HCPCS | Performed by: PODIATRIST

## 2022-07-21 ENCOUNTER — OFFICE VISIT (OUTPATIENT)
Dept: PODIATRY CLINIC | Facility: CLINIC | Age: 37
End: 2022-07-21
Payer: COMMERCIAL

## 2022-07-21 DIAGNOSIS — M72.2 PLANTAR FASCIITIS OF LEFT FOOT: Primary | ICD-10-CM

## 2022-07-21 DIAGNOSIS — M72.2 PLANTAR FASCIITIS OF RIGHT FOOT: ICD-10-CM

## 2022-07-21 PROCEDURE — 29799 UNLISTED PX CASTING/STRPG: CPT | Performed by: PODIATRIST

## 2022-07-21 PROCEDURE — L3000 FT INSERT UCB BERKELEY SHELL: HCPCS | Performed by: PODIATRIST

## 2022-08-16 ENCOUNTER — TELEPHONE (OUTPATIENT)
Dept: PODIATRY CLINIC | Facility: CLINIC | Age: 37
End: 2022-08-16

## 2022-08-16 NOTE — TELEPHONE ENCOUNTER
Pt called stating pt had the orthotic casting. Pt is having pain. Taking aleve but not helping. Can you put a rush on the order. Please call, ok to leave a message.

## 2022-08-16 NOTE — TELEPHONE ENCOUNTER
S/w pt and he continues to have bilateral foot pain. He is taking Aleve 440mg once daily and it helps but pain relief is temporary. He bought new [id number] Wears shoes but hasn't noticed a difference. He ices once a day in the evening. 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 I did inform him unfortunately there is no way to expedite the orthotics and it takes about 4-6 wks and we will call him once they come in. Pt looking for further recommendations from Dr. Heena Galvez, another medication that may help better?

## 2022-08-17 NOTE — TELEPHONE ENCOUNTER
Aleve should be taken breakfast and dinner at that dose. Its been taking approx 4 weeks for orthoses. Be sure he is icing at least 2x/day.  If not I can switch to another med? scr

## 2022-08-17 NOTE — TELEPHONE ENCOUNTER
Spoke with patient recommended taking aleve twice a day and icing atleast twice a day. Encouraged patient to call back if no improvement within the next couple days.

## 2022-08-24 ENCOUNTER — OFFICE VISIT (OUTPATIENT)
Dept: PODIATRY CLINIC | Facility: CLINIC | Age: 37
End: 2022-08-24
Payer: COMMERCIAL

## 2022-08-24 DIAGNOSIS — M72.2 PLANTAR FASCIITIS OF LEFT FOOT: Primary | ICD-10-CM

## 2022-08-24 DIAGNOSIS — M72.2 PLANTAR FASCIITIS OF RIGHT FOOT: ICD-10-CM

## 2022-09-06 ENCOUNTER — TELEPHONE (OUTPATIENT)
Dept: PODIATRY CLINIC | Facility: CLINIC | Age: 37
End: 2022-09-06

## 2022-09-06 DIAGNOSIS — M72.2 PLANTAR FASCIITIS: Primary | ICD-10-CM

## 2022-09-06 NOTE — TELEPHONE ENCOUNTER
Patient states he is still experiencing a lot of pain even with orthotics. And would like to discuss possible PT.  Please advise

## 2022-09-19 ENCOUNTER — HOSPITAL ENCOUNTER (OUTPATIENT)
Age: 37
Discharge: HOME | End: 2022-09-19

## 2022-09-19 ENCOUNTER — APPOINTMENT (OUTPATIENT)
Dept: GENERAL RADIOLOGY | Age: 37
End: 2022-09-19
Attending: NURSE PRACTITIONER

## 2022-09-19 VITALS
SYSTOLIC BLOOD PRESSURE: 129 MMHG | TEMPERATURE: 98 F | HEART RATE: 79 BPM | OXYGEN SATURATION: 96 % | WEIGHT: 190 LBS | BODY MASS INDEX: 28.79 KG/M2 | RESPIRATION RATE: 17 BRPM | HEIGHT: 68 IN | DIASTOLIC BLOOD PRESSURE: 88 MMHG

## 2022-09-19 DIAGNOSIS — M72.2 PLANTAR FASCIITIS, BILATERAL: Primary | ICD-10-CM

## 2022-09-19 PROCEDURE — 99213 OFFICE O/P EST LOW 20 MIN: CPT | Performed by: NURSE PRACTITIONER

## 2022-09-19 PROCEDURE — 73630 X-RAY EXAM OF FOOT: CPT | Performed by: NURSE PRACTITIONER

## 2022-09-23 ENCOUNTER — TELEPHONE (OUTPATIENT)
Dept: PHYSICAL THERAPY | Facility: HOSPITAL | Age: 37
End: 2022-09-23

## 2022-09-27 ENCOUNTER — OFFICE VISIT (OUTPATIENT)
Dept: PHYSICAL THERAPY | Facility: HOSPITAL | Age: 37
End: 2022-09-27
Attending: PODIATRIST

## 2022-09-27 DIAGNOSIS — M72.2 PLANTAR FASCIITIS: ICD-10-CM

## 2022-09-27 PROCEDURE — 97161 PT EVAL LOW COMPLEX 20 MIN: CPT

## 2022-09-27 PROCEDURE — 97110 THERAPEUTIC EXERCISES: CPT

## 2022-09-27 PROCEDURE — 97140 MANUAL THERAPY 1/> REGIONS: CPT

## 2022-09-29 ENCOUNTER — OFFICE VISIT (OUTPATIENT)
Dept: PHYSICAL THERAPY | Facility: HOSPITAL | Age: 37
End: 2022-09-29
Attending: PODIATRIST

## 2022-09-29 PROCEDURE — 97112 NEUROMUSCULAR REEDUCATION: CPT

## 2022-09-29 PROCEDURE — 97140 MANUAL THERAPY 1/> REGIONS: CPT

## 2022-10-04 ENCOUNTER — OFFICE VISIT (OUTPATIENT)
Dept: PHYSICAL THERAPY | Facility: HOSPITAL | Age: 37
End: 2022-10-04
Attending: PODIATRIST
Payer: COMMERCIAL

## 2022-10-04 PROCEDURE — 97112 NEUROMUSCULAR REEDUCATION: CPT

## 2022-10-04 PROCEDURE — 97110 THERAPEUTIC EXERCISES: CPT

## 2022-10-04 PROCEDURE — 97035 APP MDLTY 1+ULTRASOUND EA 15: CPT

## 2022-10-06 ENCOUNTER — OFFICE VISIT (OUTPATIENT)
Dept: PHYSICAL THERAPY | Facility: HOSPITAL | Age: 37
End: 2022-10-06
Attending: PODIATRIST
Payer: COMMERCIAL

## 2022-10-06 PROCEDURE — 97112 NEUROMUSCULAR REEDUCATION: CPT

## 2022-10-06 PROCEDURE — 97035 APP MDLTY 1+ULTRASOUND EA 15: CPT

## 2022-10-06 PROCEDURE — 97110 THERAPEUTIC EXERCISES: CPT

## 2022-10-11 ENCOUNTER — OFFICE VISIT (OUTPATIENT)
Dept: PHYSICAL THERAPY | Facility: HOSPITAL | Age: 37
End: 2022-10-11
Attending: PODIATRIST
Payer: COMMERCIAL

## 2022-10-11 PROCEDURE — 97035 APP MDLTY 1+ULTRASOUND EA 15: CPT

## 2022-10-11 PROCEDURE — 97110 THERAPEUTIC EXERCISES: CPT

## 2022-10-11 PROCEDURE — 97112 NEUROMUSCULAR REEDUCATION: CPT

## 2022-10-18 ENCOUNTER — OFFICE VISIT (OUTPATIENT)
Dept: PHYSICAL THERAPY | Facility: HOSPITAL | Age: 37
End: 2022-10-18
Attending: PODIATRIST
Payer: COMMERCIAL

## 2022-10-18 PROCEDURE — 97112 NEUROMUSCULAR REEDUCATION: CPT

## 2022-10-18 PROCEDURE — 97110 THERAPEUTIC EXERCISES: CPT

## 2022-10-18 PROCEDURE — 97140 MANUAL THERAPY 1/> REGIONS: CPT

## 2022-10-20 ENCOUNTER — OFFICE VISIT (OUTPATIENT)
Dept: PHYSICAL THERAPY | Facility: HOSPITAL | Age: 37
End: 2022-10-20
Attending: PODIATRIST
Payer: COMMERCIAL

## 2022-10-20 PROCEDURE — 97110 THERAPEUTIC EXERCISES: CPT

## 2022-10-20 PROCEDURE — 97140 MANUAL THERAPY 1/> REGIONS: CPT

## 2022-10-20 PROCEDURE — 97112 NEUROMUSCULAR REEDUCATION: CPT

## 2022-10-25 ENCOUNTER — APPOINTMENT (OUTPATIENT)
Dept: PHYSICAL THERAPY | Facility: HOSPITAL | Age: 37
End: 2022-10-25
Attending: PODIATRIST
Payer: COMMERCIAL

## 2022-10-28 ENCOUNTER — TELEPHONE (OUTPATIENT)
Dept: PHYSICAL THERAPY | Facility: HOSPITAL | Age: 37
End: 2022-10-28

## 2022-10-28 NOTE — TELEPHONE ENCOUNTER
Left message for Steven Bender. Added to 515 on 11/1/22 from waitlist. If does not work, or doing well with HEP, can feel free to cancel in myChart or call me at [phone].

## 2022-11-01 ENCOUNTER — APPOINTMENT (OUTPATIENT)
Dept: PHYSICAL THERAPY | Facility: HOSPITAL | Age: 37
End: 2022-11-01
Attending: PODIATRIST
Payer: COMMERCIAL

## 2022-11-08 ENCOUNTER — APPOINTMENT (OUTPATIENT)
Dept: PHYSICAL THERAPY | Facility: HOSPITAL | Age: 37
End: 2022-11-08
Attending: PODIATRIST
Payer: COMMERCIAL

## 2022-11-08 ENCOUNTER — TELEPHONE (OUTPATIENT)
Dept: PHYSICAL THERAPY | Facility: HOSPITAL | Age: 37
End: 2022-11-08

## 2022-11-15 ENCOUNTER — OFFICE VISIT (OUTPATIENT)
Dept: PHYSICAL THERAPY | Facility: HOSPITAL | Age: 37
End: 2022-11-15
Attending: PODIATRIST
Payer: COMMERCIAL

## 2022-11-15 PROCEDURE — 97112 NEUROMUSCULAR REEDUCATION: CPT

## 2022-11-15 PROCEDURE — 97140 MANUAL THERAPY 1/> REGIONS: CPT

## 2022-11-15 PROCEDURE — 97110 THERAPEUTIC EXERCISES: CPT

## 2022-11-22 ENCOUNTER — APPOINTMENT (OUTPATIENT)
Dept: PHYSICAL THERAPY | Facility: HOSPITAL | Age: 37
End: 2022-11-22
Attending: PODIATRIST
Payer: COMMERCIAL

## 2022-12-15 ENCOUNTER — OFFICE VISIT (OUTPATIENT)
Dept: PHYSICAL THERAPY | Facility: HOSPITAL | Age: 37
End: 2022-12-15
Attending: PODIATRIST
Payer: COMMERCIAL

## 2022-12-15 PROCEDURE — 97140 MANUAL THERAPY 1/> REGIONS: CPT

## 2022-12-15 PROCEDURE — 97110 THERAPEUTIC EXERCISES: CPT

## 2022-12-27 ENCOUNTER — APPOINTMENT (OUTPATIENT)
Dept: PHYSICAL THERAPY | Facility: HOSPITAL | Age: 37
End: 2022-12-27
Attending: PODIATRIST
Payer: COMMERCIAL

## 2023-02-01 NOTE — TELEPHONE ENCOUNTER
To MD:  The above refill request is for a controlled substance. Please indicate yes or no to refill 30 days supply plus one refill.   If more refills are appropriate, please indicate quantity    Last refilled - 10/25/2017 # 90/1 No

## 2023-02-02 NOTE — TELEPHONE ENCOUNTER
[address] PRIMARY CARE  [address] [address]  Dept: [phone]  Dept Fax: [phone]: [phone]     DARIEL Street (: 1969) is a 48 y.o. female, Established patient, here for evaluation of the following chief complaint(s):  Vaginal Itching (Vaginal itching and some discharge X7 days. Has been using some external cream which has only mildly helped. Would like printed rx to take to pharmacy. )      PCP:  Raman Andrade, APRN - CNP      Pt here today for suspected yeast infection. She has had vaginal discharge thick white but not as bad as in past. Has been on her period, so thinks that could be part of it. Vaginal itching. Had left over external nitrate topical yeast cream but not helping. Has to go out of town. OTC doesn't always work for her. Has had yeast infections in past, thinks last was over a year ago. Is no stranger, used to get them every few months. Is leaving to go out of town now, car is packed, needs paper px to fill. Review of Systems   Genitourinary:  Positive for vaginal discharge. Negative for difficulty urinating, dysuria, flank pain, frequency, hematuria, pelvic pain and urgency.         Vaginal itching and sore when pees     Past Medical History:   Diagnosis Date    Cold sore 2019     Past Surgical History:   Procedure Laterality Date    BUNIONECTOMY      right foot     Social History     Socioeconomic History    Marital status:      Spouse name: Not on file    Number of children: Not on file    Years of education: Not on file    Highest education level: Not on file   Occupational History    Not on file   Tobacco Use    Smoking status: Never    Smokeless tobacco: Never   Substance and Sexual Activity    Alcohol use: No    Drug use: No    Sexual activity: Yes     Partners: Male   Other Topics Concern    Not on file   Social History Narrative    Not To Dr Yassine Monzon to please advise on file     Social Determinants of Health     Financial Resource Strain: Low Risk     Difficulty of Paying Living Expenses: Not hard at all   Food Insecurity: No Food Insecurity    Worried About [address] in the Last Year: Never true    [facility] in the Last Year: Never true   Transportation Needs: Unknown    Lack of Transportation (Medical): Not on file    Lack of Transportation (Non-Medical): No   Physical Activity: Not on file   Stress: Not on file   Social Connections: Not on file   Intimate Partner Violence: Not on file   Housing Stability: Unknown    Unable to Pay for Housing in the Last Year: Not on file    Number of Places Lived in the Last Year: Not on file    Unstable Housing in the Last Year: No     Family History   Problem Relation Age of Onset    Cancer Mother         pancreatic     Heart Disease Father         chf & heart transplant    Cancer Father         prostate with mets    Thyroid Disease Sister     High Blood Pressure Brother       Allergies   Allergen Reactions    Penicillins      As a child not sure what happened     Prior to Admission medications    Medication Sig Start Date End Date Taking? Authorizing Provider   fluconazole (DIFLUCAN) 150 MG tablet Take 1 tab by mouth now, may repeat in 1 week if symptoms persist 2/2/23  Yes KAREN Nolen CNP   miconazole (MICOTIN) 2 % vaginal cream Place vaginally externally nightly x 3 days 2/2/23  Yes KAREN Nolen CNP   acyclovir (ZOVIRAX) 200 MG capsule take 2 capsules by mouth three times a day for 7 days 8/19/22  Yes KAREN Campbell CNP   Norgestim-Eth Estrad Triphasic 0.18/0.215/0.25 MG-35 MCG TABS take 1 tablet by mouth once daily 3/4/22  Yes KAREN Campbell CNP                I have reviewed the patient's medical history in detail and updated the computerized patient record.       OBJECTIVE    Vitals:    02/02/23 0926   BP: 136/88   Site: Left Upper Arm   Position: Sitting   Cuff Size: Small Adult Pulse: 85   Resp: 16   SpO2: 99%   Weight: 135 lb 6.4 oz (61.4 kg)   Height: 5' 9\" (1.753 m)       Physical Exam  Vitals and nursing note reviewed. Constitutional:       General: She is not in acute distress. Appearance: Normal appearance. Pulmonary:      Effort: Pulmonary effort is normal. No respiratory distress. Musculoskeletal:      Cervical back: Normal range of motion and neck supple. Lymphadenopathy:      Cervical: No cervical adenopathy. Skin:     General: Skin is warm and dry. Neurological:      Mental Status: She is alert and oriented to person, place, and time. Psychiatric:         Mood and Affect: Mood normal.         Thought Content: Thought content normal.         ASSESSMENT/ PLAN    1. Yeast infection  Acute, tx based on symptoms and hx of yeast infections  -will use oral diflucan, has never been given oral yeast treatment to help resolve symptoms faster. Likely triggered by her recent menses. Enc to use topical miconazole externally for 3 days as well  - fluconazole (DIFLUCAN) 150 MG tablet; Take 1 tab by mouth now, may repeat in 1 week if symptoms persist  Dispense: 2 tablet; Refill: 0  - miconazole (MICOTIN) 2 % vaginal cream; Place vaginally externally nightly x 3 days  Dispense: 1 each; Refill: 0          Return if symptoms worsen or fail to improve.      Electronically signed by:  KAREN Gomez CNP   2/2/23 Adequate: hears normal conversation without difficulty

## 2023-03-06 ENCOUNTER — TELEPHONE (OUTPATIENT)
Dept: INTERNAL MEDICINE CLINIC | Facility: CLINIC | Age: 38
End: 2023-03-06

## 2023-03-06 NOTE — TELEPHONE ENCOUNTER
Pt. Called stating for the last 3 days he has had a dull pain in the right kidney area. At first he though it was a kidney stone, but there wasn't  the intense pain he gets with a kidney stone. He does have pain there when he bends over, when he lays down on that side, or sometimes when he walks around. He is now wondering if he has a kidney infection. Pt. Can be reached at [phone].

## 2023-03-07 ENCOUNTER — OFFICE VISIT (OUTPATIENT)
Dept: INTERNAL MEDICINE CLINIC | Facility: CLINIC | Age: 38
End: 2023-03-07

## 2023-03-07 VITALS
WEIGHT: 189.38 LBS | HEART RATE: 76 BPM | SYSTOLIC BLOOD PRESSURE: 110 MMHG | TEMPERATURE: 98 F | HEIGHT: 68 IN | DIASTOLIC BLOOD PRESSURE: 80 MMHG | OXYGEN SATURATION: 97 % | BODY MASS INDEX: 28.7 KG/M2

## 2023-03-07 DIAGNOSIS — Z87.442 HISTORY OF NEPHROLITHIASIS: ICD-10-CM

## 2023-03-07 DIAGNOSIS — R10.9 FLANK PAIN: Primary | ICD-10-CM

## 2023-03-07 LAB
APPEARANCE: CLEAR
BILIRUB UR QL: NEGATIVE
BILIRUBIN: NEGATIVE
CLARITY UR: CLEAR
COLOR UR: YELLOW
GLUCOSE (URINE DIPSTICK): NEGATIVE MG/DL
GLUCOSE UR-MCNC: NEGATIVE MG/DL
KETONES (URINE DIPSTICK): 15 MG/DL
KETONES UR-MCNC: 20 MG/DL
LEUKOCYTE ESTERASE UR QL STRIP.AUTO: NEGATIVE
LEUKOCYTES: NEGATIVE
MULTISTIX LOT#: ABNORMAL NUMERIC
NITRITE UR QL STRIP.AUTO: NEGATIVE
NITRITE, URINE: NEGATIVE
PH UR: 5 [PH] (ref 5–8)
PH, URINE: 6 (ref 4.5–8)
PROT UR-MCNC: NEGATIVE MG/DL
SP GR UR STRIP: 1.02 (ref 1–1.03)
SPECIFIC GRAVITY: 1.03 (ref 1–1.03)
UROBILINOGEN UR STRIP-ACNC: <2
UROBILINOGEN,SEMI-QN: 0.2 MG/DL (ref 0–1.9)
VIT C UR-MCNC: NEGATIVE MG/DL

## 2023-03-07 PROCEDURE — 3079F DIAST BP 80-89 MM HG: CPT | Performed by: INTERNAL MEDICINE

## 2023-03-07 PROCEDURE — 81002 URINALYSIS NONAUTO W/O SCOPE: CPT | Performed by: INTERNAL MEDICINE

## 2023-03-07 PROCEDURE — 3008F BODY MASS INDEX DOCD: CPT | Performed by: INTERNAL MEDICINE

## 2023-03-07 PROCEDURE — 99214 OFFICE O/P EST MOD 30 MIN: CPT | Performed by: INTERNAL MEDICINE

## 2023-03-07 PROCEDURE — 3074F SYST BP LT 130 MM HG: CPT | Performed by: INTERNAL MEDICINE

## 2023-03-07 RX ORDER — TAMSULOSIN HYDROCHLORIDE 0.4 MG/1
0.4 CAPSULE ORAL DAILY
Qty: 30 CAPSULE | Refills: 0 | Status: SHIPPED | OUTPATIENT
Start: 2023-03-07 | End: 2023-04-06

## 2023-03-07 NOTE — TELEPHONE ENCOUNTER
Patient returned call. He reports RT flank/lower back pain since Friday. Pain has been constant, 8/10 at it's worse. He currently rates pain 5/10. Reports dull ache. Pain is worse with bending down, sneezing, and driving. H/o kidney stones, but he doesn't feel that it's a stone. He denies all UTI sx (no pain/burning, hematuria, fever/chills, frequency/urgency, odor cloudy urine). OV advised for examination. Patient is agreeable. Appointment scheduled for today with an associate. In the meantime, until visit, he was advised to call the office or go to urgent care for new/worsening symptoms. He verbalized understanding.

## 2023-03-08 NOTE — PATIENT INSTRUCTIONS
You were seen in clinic today for flank pain concerning for possible kidney stone on the right side. We do need to obtain urine test to get an idea of any microscopic bleeding or inflammatory fluid in the area. Given her history, there is high suspicion of kidney stone    We recommended:  - Await formal urine testing  - Obtain ultrasound of the kidneys, notify us if you have difficulty with completing the scan in a timely manner  - Aggressive water intake to help flush out the kidney stone  - Start tamsulosin 0.4 mg once a day. This is a medication that helps relax the smooth muscles of the urinary tract to allow spontaneous passage of the kidney stone. This can drop the blood pressure and has side effects of lightheadedness, dizziness    Lower suspicion for urinary tract infection, however you should monitor for fevers, chills, nausea/vomiting. Notify us if there is any clinical change with regards to your symptoms.   As long as the pain is tolerable enough to work, you may continue working at this time

## 2023-03-09 RX ORDER — TAMSULOSIN HYDROCHLORIDE 0.4 MG/1
CAPSULE ORAL
Qty: 90 CAPSULE | Refills: 0 | OUTPATIENT
Start: 2023-03-09

## 2023-03-09 NOTE — TELEPHONE ENCOUNTER
Please notify the patient that small amount of blood was detected, suggestive of kidney stone. Lower suspicion for infection as no white blood cells are noted. Was able to schedule the ultrasound?   If not, we can try to arrange for sooner appointment pending his availability today or tomorrow

## 2023-03-09 NOTE — TELEPHONE ENCOUNTER
Spoke with pt, states he has not scheduled appt for US because his pain is not severe today. Pt advised US may help in detecting what is causing the pain and blood in the urine. Pt advised to schedule US and if he is not able to get in today or tomorrow to call the office back. Pt agrees to call and schedule US.

## 2023-03-09 NOTE — TELEPHONE ENCOUNTER
Per chart review, US not scheduled. Left detailed message on pts cell, ok per [facility], advising pt to call the office if he is struggling to make an appt.      FYI to Dr Schwartz Roles

## 2023-03-13 ENCOUNTER — PATIENT MESSAGE (OUTPATIENT)
Dept: INTERNAL MEDICINE CLINIC | Facility: CLINIC | Age: 38
End: 2023-03-13

## 2023-03-13 ENCOUNTER — VIRTUAL PHONE E/M (OUTPATIENT)
Dept: INTERNAL MEDICINE CLINIC | Facility: CLINIC | Age: 38
End: 2023-03-13

## 2023-03-13 ENCOUNTER — TELEPHONE (OUTPATIENT)
Dept: INTERNAL MEDICINE CLINIC | Facility: CLINIC | Age: 38
End: 2023-03-13

## 2023-03-13 DIAGNOSIS — N20.0 NEPHROLITHIASIS: ICD-10-CM

## 2023-03-13 DIAGNOSIS — J18.9 PNEUMONIA OF LEFT UPPER LOBE DUE TO INFECTIOUS ORGANISM: ICD-10-CM

## 2023-03-13 DIAGNOSIS — J18.9 PNEUMONIA OF LEFT UPPER LOBE DUE TO INFECTIOUS ORGANISM: Primary | ICD-10-CM

## 2023-03-13 DIAGNOSIS — R21 RASH: Primary | ICD-10-CM

## 2023-03-13 PROCEDURE — 99443 PHONE E/M BY PHYS 21-30 MIN: CPT | Performed by: INTERNAL MEDICINE

## 2023-03-13 RX ORDER — LEVOFLOXACIN 500 MG/1
500 TABLET, FILM COATED ORAL DAILY
Qty: 10 TABLET | Refills: 0 | Status: SHIPPED | OUTPATIENT
Start: 2023-03-13

## 2023-03-13 NOTE — TELEPHONE ENCOUNTER
To Dr. Santiago Blanco to advise----    Spoke with pt, reports he was diagnosed with pneumonia of left lung at ER on 3/10. He was prescribed amoxicillin, doxycycline, and benzonatate. He started taking on Friday night. Overnight Saturday/early Sunday, he developed hives \"from head to toe\" and generalized itching. He stopped the medications. He has been taking benadryl 25mg 2 tabs Q6h and took a claritin early this morning. He does not feel that the hives are improving. Also states that his lower lip may slightly swollen. Denies any SOB, difficult swallowing, tongue swelling, facial swelling, or other oral swelling. Pt has appt scheduled for today at [address]. Dr. Santiago Blanco, please advise if ok to wait until appt. Reviewed ER precautions with pt. Advised if any difficulty breathing, difficulty swallowing, oral/facial swelling, etc, this would warrant ER evaluation. He verbalized understanding. He will call back with any new/worsening symptoms.

## 2023-03-13 NOTE — TELEPHONE ENCOUNTER
[facility] the following:    \"Went to ER 3/10 diagnosed with pneumonia left lung. Got amoxicillin doxycycline. Broke out in hives\"    [id number] Yesenia Alejandro for patient to wait to be seen?     Tasked to nursing

## 2023-03-13 NOTE — TELEPHONE ENCOUNTER
Discussed with Dr. Niels Bush, ok to wait until appt and continue benadryl. Called pt and notified. Re-iterated ER precautions below. Pt verbalized understanding.

## 2023-03-16 ENCOUNTER — PATIENT MESSAGE (OUTPATIENT)
Dept: INTERNAL MEDICINE CLINIC | Facility: CLINIC | Age: 38
End: 2023-03-16

## 2023-03-16 ENCOUNTER — TELEPHONE (OUTPATIENT)
Dept: INTERNAL MEDICINE CLINIC | Facility: CLINIC | Age: 38
End: 2023-03-16

## 2023-03-16 NOTE — TELEPHONE ENCOUNTER
Previous return to work note indicated, \"date to be determined\"    To Dr. Alex Garrido: new letter pended with requested return to work date.   Letter setup to be sent to patient via [address]

## 2023-03-16 NOTE — TELEPHONE ENCOUNTER
From: Ana Smalls  To: Marie Aguirre MD  Sent: 3/16/2023 7:00 AM CDT  Subject: Updated Dr amalia Cooper    Can you please send me a return to work letter for this Monday March 20?  The cough has significantly decreased and the levaquin is working too    Thank you,   Jeramie Duran

## 2023-03-17 NOTE — TELEPHONE ENCOUNTER
Patient is calling to check on the status of the Return to Work Note  Patient will be returning to work on Monday 3/20, he can't return to work without this note    Letter can be put in [address]    Please call patient with an update, phone [phone]

## 2023-03-17 NOTE — TELEPHONE ENCOUNTER
To Dr XIONG---patient calling again to follow up on note  Letter was pended for you yesterday by Stefany Carranza RN

## 2023-03-24 ENCOUNTER — HOSPITAL ENCOUNTER (OUTPATIENT)
Dept: GENERAL RADIOLOGY | Age: 38
Discharge: HOME | End: 2023-03-24
Attending: INTERNAL MEDICINE
Payer: COMMERCIAL

## 2023-03-24 DIAGNOSIS — J18.9 PNEUMONIA OF LEFT UPPER LOBE DUE TO INFECTIOUS ORGANISM: ICD-10-CM

## 2023-03-24 PROCEDURE — 71046 X-RAY EXAM CHEST 2 VIEWS: CPT | Performed by: INTERNAL MEDICINE

## 2023-04-05 ENCOUNTER — TELEPHONE (OUTPATIENT)
Dept: INTERNAL MEDICINE CLINIC | Facility: CLINIC | Age: 38
End: 2023-04-05

## 2023-04-05 RX ORDER — PREDNISONE 10 MG/1
TABLET ORAL
Qty: 30 TABLET | Refills: 0 | Status: SHIPPED | OUTPATIENT
Start: 2023-04-05

## 2023-04-05 NOTE — TELEPHONE ENCOUNTER
Patient is calling he is still experiencing a cough after he pneumonia.     Patient is asking if he can get a stronger cough medication called into [facility]

## 2023-04-06 NOTE — TELEPHONE ENCOUNTER
Post-infectious cough    -prednisone 40 mg po qD x3d, 30 mg po qD x3d, 20 mg po qD x3d, and 10 mg po qD x3d    Pt called; ERx sent

## 2023-08-28 ENCOUNTER — OFFICE VISIT (OUTPATIENT)
Dept: FAMILY MEDICINE CLINIC | Facility: CLINIC | Age: 38
End: 2023-08-28
Payer: COMMERCIAL

## 2023-08-28 VITALS
WEIGHT: 190.81 LBS | BODY MASS INDEX: 28.92 KG/M2 | TEMPERATURE: 99 F | DIASTOLIC BLOOD PRESSURE: 84 MMHG | HEART RATE: 94 BPM | OXYGEN SATURATION: 98 % | HEIGHT: 68 IN | RESPIRATION RATE: 20 BRPM | SYSTOLIC BLOOD PRESSURE: 112 MMHG

## 2023-08-28 DIAGNOSIS — U07.1 COVID-19: Primary | ICD-10-CM

## 2023-08-28 DIAGNOSIS — R09.81 NASAL CONGESTION: ICD-10-CM

## 2023-08-28 LAB
OPERATOR ID: ABNORMAL
RAPID SARS-COV-2 BY PCR: DETECTED

## 2023-08-28 PROCEDURE — 3074F SYST BP LT 130 MM HG: CPT | Performed by: PHYSICIAN ASSISTANT

## 2023-08-28 PROCEDURE — 3079F DIAST BP 80-89 MM HG: CPT | Performed by: PHYSICIAN ASSISTANT

## 2023-08-28 PROCEDURE — 99213 OFFICE O/P EST LOW 20 MIN: CPT | Performed by: PHYSICIAN ASSISTANT

## 2023-08-28 PROCEDURE — U0002 COVID-19 LAB TEST NON-CDC: HCPCS | Performed by: PHYSICIAN ASSISTANT

## 2023-08-28 PROCEDURE — 3008F BODY MASS INDEX DOCD: CPT | Performed by: PHYSICIAN ASSISTANT

## 2023-08-29 ENCOUNTER — TELEPHONE (OUTPATIENT)
Dept: FAMILY MEDICINE CLINIC | Facility: CLINIC | Age: 38
End: 2023-08-29

## 2023-08-31 ENCOUNTER — OFFICE VISIT (OUTPATIENT)
Dept: FAMILY MEDICINE CLINIC | Facility: CLINIC | Age: 38
End: 2023-08-31
Payer: COMMERCIAL

## 2023-08-31 VITALS
HEIGHT: 68 IN | OXYGEN SATURATION: 98 % | TEMPERATURE: 98 F | HEART RATE: 76 BPM | DIASTOLIC BLOOD PRESSURE: 61 MMHG | WEIGHT: 190 LBS | RESPIRATION RATE: 18 BRPM | BODY MASS INDEX: 28.79 KG/M2 | SYSTOLIC BLOOD PRESSURE: 116 MMHG

## 2023-08-31 DIAGNOSIS — U07.1 POSITIVE SELF-ADMINISTERED ANTIGEN TEST FOR COVID-19: ICD-10-CM

## 2023-08-31 DIAGNOSIS — U07.1 COVID-19 VIRUS INFECTION: Primary | ICD-10-CM

## 2023-08-31 LAB
COVID19 BINAX NOW ANTIGEN: DETECTED
OPERATOR ID: ABNORMAL

## 2023-08-31 PROCEDURE — 3074F SYST BP LT 130 MM HG: CPT | Performed by: NURSE PRACTITIONER

## 2023-08-31 PROCEDURE — 3008F BODY MASS INDEX DOCD: CPT | Performed by: NURSE PRACTITIONER

## 2023-08-31 PROCEDURE — 99213 OFFICE O/P EST LOW 20 MIN: CPT | Performed by: NURSE PRACTITIONER

## 2023-08-31 PROCEDURE — 3078F DIAST BP <80 MM HG: CPT | Performed by: NURSE PRACTITIONER

## 2023-10-09 ENCOUNTER — OFFICE VISIT (OUTPATIENT)
Dept: INTERNAL MEDICINE CLINIC | Facility: CLINIC | Age: 38
End: 2023-10-09

## 2023-10-09 VITALS
BODY MASS INDEX: 29.7 KG/M2 | WEIGHT: 196 LBS | HEIGHT: 68 IN | OXYGEN SATURATION: 96 % | DIASTOLIC BLOOD PRESSURE: 70 MMHG | SYSTOLIC BLOOD PRESSURE: 110 MMHG | HEART RATE: 76 BPM | TEMPERATURE: 98 F

## 2023-10-09 DIAGNOSIS — Z00.00 PHYSICAL EXAM, ANNUAL: Primary | ICD-10-CM

## 2023-10-09 DIAGNOSIS — E66.3 OVERWEIGHT (BMI 25.0-29.9): ICD-10-CM

## 2023-10-09 DIAGNOSIS — L29.8 AQUAGENIC PRURITUS: ICD-10-CM

## 2023-10-09 PROCEDURE — 3008F BODY MASS INDEX DOCD: CPT | Performed by: INTERNAL MEDICINE

## 2023-10-09 PROCEDURE — 99395 PREV VISIT EST AGE 18-39: CPT | Performed by: INTERNAL MEDICINE

## 2023-10-09 PROCEDURE — 3074F SYST BP LT 130 MM HG: CPT | Performed by: INTERNAL MEDICINE

## 2023-10-09 PROCEDURE — 3078F DIAST BP <80 MM HG: CPT | Performed by: INTERNAL MEDICINE

## 2023-10-14 ENCOUNTER — LAB ENCOUNTER (OUTPATIENT)
Dept: LAB | Facility: HOSPITAL | Age: 38
End: 2023-10-14
Attending: INTERNAL MEDICINE
Payer: COMMERCIAL

## 2023-10-14 DIAGNOSIS — Z00.00 PHYSICAL EXAM, ANNUAL: ICD-10-CM

## 2023-10-14 DIAGNOSIS — L29.8 AQUAGENIC PRURITUS: ICD-10-CM

## 2023-10-14 LAB
ALBUMIN SERPL-MCNC: 4 G/DL (ref 3.4–5)
ALBUMIN/GLOB SERPL: 1 {RATIO} (ref 1–2)
ALP LIVER SERPL-CCNC: 80 U/L
ALT SERPL-CCNC: 44 U/L
ANION GAP SERPL CALC-SCNC: 6 MMOL/L (ref 0–18)
AST SERPL-CCNC: 16 U/L (ref 15–37)
BASOPHILS # BLD AUTO: 0.11 X10(3) UL (ref 0–0.2)
BASOPHILS NFR BLD AUTO: 1.3 %
BILIRUB SERPL-MCNC: 0.6 MG/DL (ref 0.1–2)
BUN BLD-MCNC: 12 MG/DL (ref 7–18)
BUN/CREAT SERPL: 11 (ref 10–20)
CALCIUM BLD-MCNC: 9.4 MG/DL (ref 8.5–10.1)
CHLORIDE SERPL-SCNC: 104 MMOL/L (ref 98–112)
CHOLEST SERPL-MCNC: 262 MG/DL (ref ?–200)
CO2 SERPL-SCNC: 31 MMOL/L (ref 21–32)
CREAT BLD-MCNC: 1.09 MG/DL
DEPRECATED RDW RBC AUTO: 41.8 FL (ref 35.1–46.3)
EGFRCR SERPLBLD CKD-EPI 2021: 90 ML/MIN/1.73M2 (ref 60–?)
EOSINOPHIL # BLD AUTO: 0.17 X10(3) UL (ref 0–0.7)
EOSINOPHIL NFR BLD AUTO: 2 %
ERYTHROCYTE [DISTWIDTH] IN BLOOD BY AUTOMATED COUNT: 12.5 % (ref 11–15)
FASTING PATIENT LIPID ANSWER: YES
FASTING STATUS PATIENT QL REPORTED: YES
GLOBULIN PLAS-MCNC: 4 G/DL (ref 2.8–4.4)
GLUCOSE BLD-MCNC: 102 MG/DL (ref 70–99)
HCT VFR BLD AUTO: 52.2 %
HDLC SERPL-MCNC: 51 MG/DL (ref 40–59)
HGB BLD-MCNC: 17.2 G/DL
IMM GRANULOCYTES # BLD AUTO: 0.03 X10(3) UL (ref 0–1)
IMM GRANULOCYTES NFR BLD: 0.4 %
LDLC SERPL CALC-MCNC: 177 MG/DL (ref ?–100)
LYMPHOCYTES # BLD AUTO: 2.06 X10(3) UL (ref 1–4)
LYMPHOCYTES NFR BLD AUTO: 24.6 %
MCH RBC QN AUTO: 30 PG (ref 26–34)
MCHC RBC AUTO-ENTMCNC: 33 G/DL (ref 31–37)
MCV RBC AUTO: 90.9 FL
MONOCYTES # BLD AUTO: 0.9 X10(3) UL (ref 0.1–1)
MONOCYTES NFR BLD AUTO: 10.8 %
NEUTROPHILS # BLD AUTO: 5.09 X10 (3) UL (ref 1.5–7.7)
NEUTROPHILS # BLD AUTO: 5.09 X10(3) UL (ref 1.5–7.7)
NEUTROPHILS NFR BLD AUTO: 60.9 %
NONHDLC SERPL-MCNC: 211 MG/DL (ref ?–130)
OSMOLALITY SERPL CALC.SUM OF ELEC: 292 MOSM/KG (ref 275–295)
PLATELET # BLD AUTO: 243 10(3)UL (ref 150–450)
POTASSIUM SERPL-SCNC: 3.7 MMOL/L (ref 3.5–5.1)
PROT SERPL-MCNC: 8 G/DL (ref 6.4–8.2)
RBC # BLD AUTO: 5.74 X10(6)UL
SODIUM SERPL-SCNC: 141 MMOL/L (ref 136–145)
TRIGL SERPL-MCNC: 185 MG/DL (ref 30–149)
TSI SER-ACNC: 1.29 MIU/ML (ref 0.36–3.74)
VLDLC SERPL CALC-MCNC: 38 MG/DL (ref 0–30)
WBC # BLD AUTO: 8.4 X10(3) UL (ref 4–11)

## 2023-10-14 PROCEDURE — 80053 COMPREHEN METABOLIC PANEL: CPT

## 2023-10-14 PROCEDURE — 85025 COMPLETE CBC W/AUTO DIFF WBC: CPT

## 2023-10-14 PROCEDURE — 36415 COLL VENOUS BLD VENIPUNCTURE: CPT

## 2023-10-14 PROCEDURE — 84443 ASSAY THYROID STIM HORMONE: CPT

## 2023-10-14 PROCEDURE — 80061 LIPID PANEL: CPT

## 2023-11-13 ENCOUNTER — TELEPHONE (OUTPATIENT)
Dept: INTERNAL MEDICINE CLINIC | Facility: CLINIC | Age: 38
End: 2023-11-13

## 2023-11-13 NOTE — TELEPHONE ENCOUNTER
Patient is calling to request a call with lab results done on 10/14    Please call patient [phone]

## 2023-11-14 NOTE — TELEPHONE ENCOUNTER
Imp- hypercholesterolemia         Other labs and CXR unremarkable    Patient not available; LMVM; LMTCB

## 2024-06-27 ENCOUNTER — OFFICE VISIT (OUTPATIENT)
Dept: INTERNAL MEDICINE CLINIC | Facility: CLINIC | Age: 39
End: 2024-06-27

## 2024-06-27 ENCOUNTER — LAB ENCOUNTER (OUTPATIENT)
Dept: LAB | Age: 39
End: 2024-06-27
Attending: INTERNAL MEDICINE
Payer: COMMERCIAL

## 2024-06-27 VITALS
HEIGHT: 68 IN | WEIGHT: 202 LBS | HEART RATE: 64 BPM | TEMPERATURE: 97 F | BODY MASS INDEX: 30.62 KG/M2 | SYSTOLIC BLOOD PRESSURE: 130 MMHG | DIASTOLIC BLOOD PRESSURE: 90 MMHG

## 2024-06-27 DIAGNOSIS — Z00.00 PHYSICAL EXAM, ANNUAL: ICD-10-CM

## 2024-06-27 DIAGNOSIS — R10.11 RUQ ABDOMINAL PAIN: ICD-10-CM

## 2024-06-27 DIAGNOSIS — E78.00 HYPERCHOLESTEREMIA: ICD-10-CM

## 2024-06-27 DIAGNOSIS — E66.3 OVERWEIGHT (BMI 25.0-29.9): ICD-10-CM

## 2024-06-27 DIAGNOSIS — Z00.00 PHYSICAL EXAM, ANNUAL: Primary | ICD-10-CM

## 2024-06-27 DIAGNOSIS — K64.8 INTERNAL HEMORRHOIDS: ICD-10-CM

## 2024-06-27 LAB
ALBUMIN SERPL-MCNC: 4.8 G/DL (ref 3.2–4.8)
ALBUMIN/GLOB SERPL: 1.6 {RATIO} (ref 1–2)
ALP LIVER SERPL-CCNC: 92 U/L
ALT SERPL-CCNC: 32 U/L
ANION GAP SERPL CALC-SCNC: 7 MMOL/L (ref 0–18)
AST SERPL-CCNC: 17 U/L (ref ?–34)
BILIRUB SERPL-MCNC: 0.6 MG/DL (ref 0.3–1.2)
BUN BLD-MCNC: 9 MG/DL (ref 9–23)
BUN/CREAT SERPL: 8.8 (ref 10–20)
CALCIUM BLD-MCNC: 9.9 MG/DL (ref 8.7–10.4)
CHLORIDE SERPL-SCNC: 103 MMOL/L (ref 98–112)
CHOLEST SERPL-MCNC: 247 MG/DL (ref ?–200)
CO2 SERPL-SCNC: 30 MMOL/L (ref 21–32)
CREAT BLD-MCNC: 1.02 MG/DL
EGFRCR SERPLBLD CKD-EPI 2021: 98 ML/MIN/1.73M2 (ref 60–?)
FASTING PATIENT LIPID ANSWER: YES
FASTING STATUS PATIENT QL REPORTED: YES
GLOBULIN PLAS-MCNC: 3 G/DL (ref 2–3.5)
GLUCOSE BLD-MCNC: 91 MG/DL (ref 70–99)
HDLC SERPL-MCNC: 50 MG/DL (ref 40–59)
LDLC SERPL CALC-MCNC: 169 MG/DL (ref ?–100)
NONHDLC SERPL-MCNC: 197 MG/DL (ref ?–130)
OSMOLALITY SERPL CALC.SUM OF ELEC: 288 MOSM/KG (ref 275–295)
POTASSIUM SERPL-SCNC: 3.6 MMOL/L (ref 3.5–5.1)
PROT SERPL-MCNC: 7.8 G/DL (ref 5.7–8.2)
SODIUM SERPL-SCNC: 140 MMOL/L (ref 136–145)
TRIGL SERPL-MCNC: 151 MG/DL (ref 30–149)
TSI SER-ACNC: 0.93 MIU/ML (ref 0.55–4.78)
VLDLC SERPL CALC-MCNC: 30 MG/DL (ref 0–30)

## 2024-06-27 PROCEDURE — 85025 COMPLETE CBC W/AUTO DIFF WBC: CPT

## 2024-06-27 PROCEDURE — 85027 COMPLETE CBC AUTOMATED: CPT

## 2024-06-27 PROCEDURE — 3075F SYST BP GE 130 - 139MM HG: CPT | Performed by: INTERNAL MEDICINE

## 2024-06-27 PROCEDURE — 36415 COLL VENOUS BLD VENIPUNCTURE: CPT

## 2024-06-27 PROCEDURE — 85060 BLOOD SMEAR INTERPRETATION: CPT

## 2024-06-27 PROCEDURE — 3008F BODY MASS INDEX DOCD: CPT | Performed by: INTERNAL MEDICINE

## 2024-06-27 PROCEDURE — 99395 PREV VISIT EST AGE 18-39: CPT | Performed by: INTERNAL MEDICINE

## 2024-06-27 PROCEDURE — 84443 ASSAY THYROID STIM HORMONE: CPT

## 2024-06-27 PROCEDURE — 85007 BL SMEAR W/DIFF WBC COUNT: CPT

## 2024-06-27 PROCEDURE — 80053 COMPREHEN METABOLIC PANEL: CPT

## 2024-06-27 PROCEDURE — 3080F DIAST BP >= 90 MM HG: CPT | Performed by: INTERNAL MEDICINE

## 2024-06-27 PROCEDURE — 80061 LIPID PANEL: CPT

## 2024-06-27 NOTE — PROGRESS NOTES
Chao Ruiz is a 38 year old male.    HPI:   No chief complaint on file.      39 y/o M here for physical exam; stopped drinking alcohol since 5/23/224; drinking Heineken zero; has occasional RUQ abdominal pain that occurs after eating; has occasional suprapubic abdominal pressure after eating; using Preparation H for internal hemorrhoids that cause blood when wiping; no constipation; no diarrhea; no CP; no SOB; no headaches; no palpitation        HISTORY:  Past Medical History:    Depression    Esophageal reflux    Kidney stone    KUB 2008      Past Surgical History:   Procedure Laterality Date     error tracking       error tracking      Excision turbinate,submucous  09/27/2018    Repair of nasal septum  09/27/2018      Family History   Problem Relation Age of Onset    High Blood Pressure Father     High Blood Pressure Mother       Social History:   Social History     Socioeconomic History    Marital status: Single   Tobacco Use    Smoking status: Never    Smokeless tobacco: Never   Vaping Use    Vaping status: Never Used   Substance and Sexual Activity    Alcohol use: Yes     Comment: occassionally    Drug use: No   Other Topics Concern    Blood Transfusions No    Caffeine Concern No    Exercise No    Seat Belt Yes        Medications (Active prior to today's visit):  Current Outpatient Medications   Medication Sig Dispense Refill    hydrocortisone 2.5 % External Ointment ATAA perianal area QID prn 30 g 1       Allergies:  Allergies   Allergen Reactions    Augmentin [Amoxicillin-Pot Clavulanate] RASH               ROS:   Constitutional: no weight loss; no fatigue  ENMT:  Negative for ear drainage, hearing loss and nasal drainage  Eyes:  Negative for eye discharge and vision loss  Cardiovascular:  Negative for chest pain; negative palpitations  Respiratory:  Negative for cough, dyspnea and wheezing  Endocrine:  Negative for abnormal sleep patterns, increased activity, polydipsia and  polyphagia  Gastrointestinal:  Negative for abdominal pain, constipation, decreased appetite, diarrhea and vomiting; no melena or hematochezia  Musculoskeletal:  Negative for arthralgias or myalgias  Genitourinary:  Negative for dysuria or polyuria  Hema/Lymph:  Negative for easy bleeding and easy bruising  Integumentary:  Negative for pruritus and rash  Neurological:  Negative for gait disturbance; negative for paresthesias   All other review of systems are negative.        PHYSICAL EXAM:   Blood pressure 130/90, pulse 64, temperature 97.4 °F (36.3 °C), temperature source Oral, height 5' 8\" (1.727 m), weight 202 lb (91.6 kg).  Constitutional: alert and oriented x3 in no acute distress  HEENT- EOMI, PERRL  Nose/Mouth/Throat: pharynx without erythema; no oral lesions  Neck/Thyroid: neck supple; no thyromegaly  Lymphatics: no lymphadenopathy of neck or groin  Cardiovascular: RRR, S1, S2, no S3 or murmur  Respiratory: lungs without crackles or wheezes  Abdomen: normoactive bowel sounds, soft, non-tender and non-distended  Extremities: no clubbing, cyanosis or edema  Vascular: no carotid bruits; DP/PT 2/2  Musculoskeletal: Motor 5/5 upper and lower extremities  Neurological: cranial nerves II-XII intact; light touch and proprioception intact  Skin: no rash or ulcerations         ASSESSMENT/PLAN:   Physical exam  Check CBC, CMP, TSH, Lipids     RUQ abdominal pain  Possible gastritis; only in morning after drinking Heineken zero  -trial omeprazole 20 mg po every day x 14d    Aquagenic pruritus  -resolved     Overweight  Body mass index is 30.71 kg/m². ; discussed low carbohydrate diet; advised increased activity     Internal hemorrhoids  -HC 2.5% oint QID prn     hypercholesterolemia   in Oct 2023; advised low chol diet; check Lipids       Spent 30 minutes obtaining history, evaluating patient, discussing treatment options, diet, exercise, review of available labs and radiology reports, and completing  documentation.          Orders This Visit:  Orders Placed This Encounter   Procedures    Comp Metabolic Panel (14)    CBC With Differential With Platelet    Lipid Panel    TSH W Reflex To Free T4       Meds This Visit:  Requested Prescriptions     Signed Prescriptions Disp Refills    hydrocortisone 2.5 % External Ointment 30 g 1     Sig: ATAA perianal area QID prn       Imaging & Referrals:  None     6/27/2024  Daniel Segal MD

## 2024-06-28 LAB
BASOPHILS # BLD: 0.19 X10(3) UL (ref 0–0.2)
BASOPHILS NFR BLD: 2 %
DEPRECATED RDW RBC AUTO: 40.7 FL (ref 35.1–46.3)
EOSINOPHIL # BLD: 0.29 X10(3) UL (ref 0–0.7)
EOSINOPHIL NFR BLD: 3 %
ERYTHROCYTE [DISTWIDTH] IN BLOOD BY AUTOMATED COUNT: 12.5 % (ref 11–15)
HCT VFR BLD AUTO: 51.2 %
HGB BLD-MCNC: 16.9 G/DL
LYMPHOCYTES NFR BLD: 3.99 X10(3) UL (ref 1–4)
LYMPHOCYTES NFR BLD: 34 %
MCH RBC QN AUTO: 29.5 PG (ref 26–34)
MCHC RBC AUTO-ENTMCNC: 33 G/DL (ref 31–37)
MCV RBC AUTO: 89.4 FL
MONOCYTES # BLD: 0.19 X10(3) UL (ref 0.1–1)
MONOCYTES NFR BLD: 2 %
NEUTROPHILS # BLD AUTO: 5.94 X10 (3) UL (ref 1.5–7.7)
NEUTROPHILS NFR BLD: 51 %
NEUTS HYPERSEG # BLD: 4.85 X10(3) UL (ref 1.5–7.7)
PLATELET # BLD AUTO: 227 10(3)UL (ref 150–450)
RBC # BLD AUTO: 5.73 X10(6)UL
TOTAL CELLS COUNTED BLD: 100
VARIANT LYMPHS NFR BLD MANUAL: 8 %
WBC # BLD AUTO: 9.5 X10(3) UL (ref 4–11)

## 2024-07-17 ENCOUNTER — HOSPITAL ENCOUNTER (OUTPATIENT)
Dept: GENERAL RADIOLOGY | Age: 39
Discharge: HOME | End: 2024-07-17
Attending: INTERNAL MEDICINE
Payer: COMMERCIAL

## 2024-07-17 ENCOUNTER — TELEPHONE (OUTPATIENT)
Dept: INTERNAL MEDICINE CLINIC | Facility: CLINIC | Age: 39
End: 2024-07-17

## 2024-07-17 DIAGNOSIS — R07.81 RIB PAIN ON LEFT SIDE: ICD-10-CM

## 2024-07-17 DIAGNOSIS — R07.81 RIB PAIN ON LEFT SIDE: Primary | ICD-10-CM

## 2024-07-17 PROCEDURE — 71101 X-RAY EXAM UNILAT RIBS/CHEST: CPT | Performed by: INTERNAL MEDICINE

## 2024-07-17 NOTE — TELEPHONE ENCOUNTER
Patient is calling around 7/9 patient developed a pain below his left breast.  The only thing he can think of is a muscle strain from picking up her nephew.    Patient was seen in UC on 7/13 and the pain was diagnosed as a muscle pull, he was given 3 days of Prednisone it did help with the pain.  Once he completed the medication the pain has returned  Patient is asking if he can get an order for an X-Ray    Please call patient [phone]

## 2024-07-17 NOTE — TELEPHONE ENCOUNTER
Per patient, he was seen at [facility] in [place] on 7/13/24 (went to [facility] ER but left without being seen) for the pain under his left breast. They wanted to do an EKG and CXR but patient declined because he believed it was a pulled muscle. The NP at [facility] examined him and agreed, giving him a prescription for 3 days of Prednisone. Pain better on the Prednisone but now has returned. Patient is asking for chest X-ray order from .     To

## 2024-07-18 ENCOUNTER — TELEPHONE (OUTPATIENT)
Dept: INTERNAL MEDICINE CLINIC | Facility: CLINIC | Age: 39
End: 2024-07-18

## 2024-07-18 DIAGNOSIS — R91.8 MASS OF LEFT LUNG: Primary | ICD-10-CM

## 2024-07-18 NOTE — TELEPHONE ENCOUNTER
Patient received xray results in [facility]    Requests call back from Dr Sanchez   to discuss next steps [phone]

## 2024-07-18 NOTE — TELEPHONE ENCOUNTER
PCXR and left ribs on 7/17/24 shows 2.4 cm nodular opacity in the left upper lobe is indeterminate.  Underlying pulmonary mass should be excluded.  Recommend CT scan for further evaluation.     Imp - lung mass    Rec- CT chest with IV contrast    Pt called; he verbalized understanding

## 2024-07-29 ENCOUNTER — TELEPHONE (OUTPATIENT)
Dept: INTERNAL MEDICINE CLINIC | Facility: CLINIC | Age: 39
End: 2024-07-29

## 2024-07-29 DIAGNOSIS — J98.4 CAVITARY LESION OF LUNG: Primary | ICD-10-CM

## 2024-07-29 RX ORDER — LEVOFLOXACIN 750 MG/1
750 TABLET, FILM COATED ORAL DAILY
Qty: 21 TABLET | Refills: 0 | Status: SHIPPED | OUTPATIENT
Start: 2024-07-29

## 2024-07-29 RX ORDER — METRONIDAZOLE 500 MG/1
500 TABLET ORAL 3 TIMES DAILY
Qty: 63 TABLET | Refills: 0 | Status: SHIPPED | OUTPATIENT
Start: 2024-07-29

## 2024-07-29 NOTE — TELEPHONE ENCOUNTER
Pt had pneumonia in [facility] in March 2023;     He states he quit alcohol on 5/28/24    CT chest shows A 2.2 cm thick-walled cavitary lesion in the lateral subpleural left upper lobe     DDx lung abscess, TB, malignancy    Imp- RAJ cavitary lung lesion    Rec- levofloxacin 750 mg po every day x 21d    Metronidazole 500 mg po TID x 21d    Advised quantiferon gold TB, or PPD--> pt declines both    Repeat CT chest in 6 weeks    Pt called; he verbalized understanding

## 2024-07-29 NOTE — TELEPHONE ENCOUNTER
Patient is calling to let Dr Segal know he had the CT Chest and can see the results are ready to view    Requesting Dr Segal call with results

## 2024-08-22 ENCOUNTER — PATIENT MESSAGE (OUTPATIENT)
Dept: INTERNAL MEDICINE CLINIC | Facility: CLINIC | Age: 39
End: 2024-08-22

## 2024-08-22 ENCOUNTER — TELEPHONE (OUTPATIENT)
Dept: INTERNAL MEDICINE CLINIC | Facility: CLINIC | Age: 39
End: 2024-08-22

## 2024-08-22 NOTE — TELEPHONE ENCOUNTER
From: Chao Ruiz  To: Daniel Segal  Sent: 8/22/2024  3:15 PM CDT  Subject: Chafing    Hi Dr Gomez,    I’ve noticed for the past week or so that I’ve had some very uncomfortable chafing on the skin between the testicles and anus.  I’ve used OTC lotramin, and the prescription hydrocortisone cream - both to no avail.  Can you recommend any other treatments either otc or script strength to relieve this issue?    Please adviseLogan

## 2024-08-22 NOTE — TELEPHONE ENCOUNTER
Advise dermatology consultation; please refer to     Dr Breezy Reno    [address] [place] [address] [id number] [phone]    Please call pt

## 2024-09-17 ENCOUNTER — HOSPITAL ENCOUNTER (OUTPATIENT)
Dept: CT IMAGING | Facility: HOSPITAL | Age: 39
Discharge: HOME | End: 2024-09-17
Attending: INTERNAL MEDICINE
Payer: COMMERCIAL

## 2024-09-17 DIAGNOSIS — J98.4 CAVITARY LESION OF LUNG: ICD-10-CM

## 2024-09-17 PROCEDURE — 71250 CT THORAX DX C-: CPT | Performed by: INTERNAL MEDICINE

## 2024-09-18 ENCOUNTER — TELEPHONE (OUTPATIENT)
Dept: INTERNAL MEDICINE CLINIC | Facility: CLINIC | Age: 39
End: 2024-09-18

## 2024-09-18 DIAGNOSIS — R91.1 LUNG NODULE: Primary | ICD-10-CM

## 2024-09-18 NOTE — TELEPHONE ENCOUNTER
Patient called to request call back from Dr. Segal to discuss results from Chest CT done yesterday.     Patient Phone #[phone]

## 2024-09-19 NOTE — TELEPHONE ENCOUNTER
Imp-lung nodule    CT chest on 9/17/24  Shows 1.9 cm left upper lobe pulmonary nodule is unchanged since 7/17/2024.  Pulmonary malignancy cannot be excluded.     Rec- PET/CT ordered    Pt referred to Pulm Dr Salvador    Pt called; he verbalized understanding

## 2024-10-01 ENCOUNTER — HOSPITAL ENCOUNTER (OUTPATIENT)
Dept: NUCLEAR MEDICINE | Facility: HOSPITAL | Age: 39
Discharge: HOME | End: 2024-10-01
Attending: INTERNAL MEDICINE
Payer: COMMERCIAL

## 2024-10-01 DIAGNOSIS — R91.1 LUNG NODULE: ICD-10-CM

## 2024-10-01 LAB — GLUCOSE BLDC GLUCOMTR-MCNC: 102 MG/DL (ref 70–99)

## 2024-10-01 PROCEDURE — 82962 GLUCOSE BLOOD TEST: CPT

## 2024-10-01 PROCEDURE — 78815 PET IMAGE W/CT SKULL-THIGH: CPT | Performed by: INTERNAL MEDICINE

## 2024-10-02 ENCOUNTER — TELEPHONE (OUTPATIENT)
Dept: INTERNAL MEDICINE CLINIC | Facility: CLINIC | Age: 39
End: 2024-10-02

## 2024-10-02 NOTE — TELEPHONE ENCOUNTER
Patient is calling asking for the test results from his PET SCAN that was done on 10/1/2024.    Patient is asking what his next steps are.    Please call and advise

## 2024-10-03 ENCOUNTER — TELEPHONE (OUTPATIENT)
Dept: PULMONOLOGY | Facility: CLINIC | Age: 39
End: 2024-10-03

## 2024-10-03 NOTE — TELEPHONE ENCOUNTER
Patient calling regards sooner appointment, please call. (See telephone encounter from 10/2/24 fro Dr Segal)

## 2024-10-03 NOTE — TELEPHONE ENCOUNTER
Patient accepted appointment with Dr. Salvador 10/8/24 at 12 pm ([facility]). Appointment information given. He voiced understanding.

## 2024-10-03 NOTE — TELEPHONE ENCOUNTER
PET scan 10/1/24    2 cm left upper lobe round solid pulmonary nodule.  It has peripherally distributed borderline hypermetabolic activity suggesting it has a cystic/necrotic component.  It has hypermetabolic activity which is between mediastinal and hepatic blood pool.     Therefore it could represent low-grade primary or metastatic focus or infectious process.  There is an enlarged left suprahilar node may represent metastatic component or reactive enlargement.  No additional mediastinal/hilar lymphadenopathy.     S/p Levaquin 750 mg po every day x 21d  starting 7/29/24 for suspected lung abscess/ infection    Did not tolerate metronidazole due to nausea    I favor RA[facility] finding is sequelae from prior infection starting from March 2023    Pt has appt to see Pulm Dr Salvador on 12/6/24    Pt called with PET results; he is inquiring if sooner appt is available; will ask Pulmonary RN pool if sooner appt with Dr Salvador or colleague is possible

## 2024-10-03 NOTE — TELEPHONE ENCOUNTER
Patient is currently scheduled with Dr. Salvador on 12/6/24 10 am [place].    Dr. Salvador- do you want to add patient to your schedule sooner? When?

## 2024-10-08 ENCOUNTER — OFFICE VISIT (OUTPATIENT)
Dept: PULMONOLOGY | Facility: CLINIC | Age: 39
End: 2024-10-08
Payer: COMMERCIAL

## 2024-10-08 VITALS
RESPIRATION RATE: 12 BRPM | HEART RATE: 66 BPM | SYSTOLIC BLOOD PRESSURE: 124 MMHG | DIASTOLIC BLOOD PRESSURE: 77 MMHG | HEIGHT: 68 IN | BODY MASS INDEX: 30.49 KG/M2 | OXYGEN SATURATION: 98 % | WEIGHT: 201.19 LBS

## 2024-10-08 DIAGNOSIS — R91.1 LUNG NODULE: Primary | ICD-10-CM

## 2024-10-08 PROCEDURE — 3074F SYST BP LT 130 MM HG: CPT | Performed by: INTERNAL MEDICINE

## 2024-10-08 PROCEDURE — 3008F BODY MASS INDEX DOCD: CPT | Performed by: INTERNAL MEDICINE

## 2024-10-08 PROCEDURE — 3078F DIAST BP <80 MM HG: CPT | Performed by: INTERNAL MEDICINE

## 2024-10-08 PROCEDURE — 99204 OFFICE O/P NEW MOD 45 MIN: CPT | Performed by: INTERNAL MEDICINE

## 2024-10-08 NOTE — H&P
Referring Physician  Daniel Segal MD    Chief Complaint  Lung nodule    History of Present Illness  Patient is a 39-year-old male presents pulmonary clinic for initial visit.  States he was diagnosed and treated with pneumonia March 2023.  At that time he had dyspnea and cough and treated with course of Augmentin and Levaquin.  Admits to some anterior left-sided chest wall discomfort July 2024.  X-ray and subsequent CT chest with evidence of left upper lobe nodule.  Treated with 3-week course of Levaquin and 1 week course of Flagyl at the time.  During this recent episode denies worsening dyspnea symptoms, productive cough, fevers, chills.  Subsequent CT chest and PET/CT with persistent left upper lobe nodule seen.  Patient denies significant dyspnea at this time.  Some occasional nonproductive cough present.  Denies weight loss, loss of appetite, lethargy.  Denies any environmental inhalation exposure    Review of Systems  Constitutional: denies weight loss, fevers, chills, weakness, fatigue, recent illness  HEENT: denies epistaxis, sore throat, postnasal drip  Cardio: denies chest pain, chest pressure, palpitations  Respiratory: denies dyspnea, cough, wheezing, hemoptysis   GI: denies nausea, vomiting, abdominal pain  : denies dysuria, hematuria  Musculoskeletal: denies arthralgia, myalgia  Integumentary: denies rash, itching  Neurological: denies syncope, weakness, dizziness,   Psychiatric: denies depression, anxiety  Hematologic: denies bruising    Past Medical History  Past Medical History:    Depression    Esophageal reflux    Kidney stone    KUB 2008        Surgical History  Past Surgical History:   Procedure Laterality Date     error tracking       error tracking      Excision turbinate,submucous  09/27/2018    Repair of nasal septum  09/27/2018       Family History  Family History   Problem Relation Age of Onset    High Blood Pressure Father     High Blood Pressure Mother          Social History  Tobacco: Denies  Alcohol: Denies significant intake  Illicit Drugs: Denies    Medications  Current Outpatient Medications on File Prior to Visit   Medication Sig Dispense Refill    levoFLOXacin 750 MG Oral Tab Take 1 tablet (750 mg total) by mouth daily. 21 tablet 0    metRONIDAZOLE 500 MG Oral Tab Take 1 tablet (500 mg total) by mouth 3 (three) times daily. 63 tablet 0    hydrocortisone 2.5 % External Ointment ATAA perianal area QID prn 30 g 1     No current facility-administered medications on file prior to visit.       Allergies  Allergies   Allergen Reactions    Augmentin [Amoxicillin-Pot Clavulanate] RASH       Physical Exam  Constitutional: no acute distress  HEENT: PERRL  Neck: supple, no JVD  Cardio: RRR, S1 S2  Respiratory: clear to auscultation bilaterally, no wheezing, rales, rhonchi, crackles  GI: abdomen soft, non tender, active bowel sounds, no organomegaly  Extremities: no clubbing, cyanosis, edema  Neurologic: no gross motor deficits  Skin: warm, dry  Lymphatic: no supraclavicular lymphadenopathy     Imaging  PET/CT performed on 10/1/2024 with 2 cm left upper lobe rounded pulmonary nodule with peripherally distributed borderline hypermetabolic activity seen.  Left suprahilar node seen    Pulmonary Function Testing  None available to review    Assessment  1.  Left lung nodule  2.  Prior history of pneumonia    Plan  -Patient presents today for pulmonary evaluation.  Persistent left upper lobe nodule seen.  Unclear if this was present on subsequent chest x-ray from 3/23/2023 at outside institution.  Will attempt to obtain imaging to review.  Given persistence of nodule and size recommended tissue sampling.  Given location would consider CT-guided biopsy with IR to definitively rule out malignancy although low likelihood and further infectious workup.  Chest x-ray at outside institution from 3/10/2023 with left upper lobe opacity seen concerning for pneumonia.  Unclear if left upper  lobe nodule secondary to prior infectious process.  Given patient treated with antibiotic therapy in July 2024 with Levaquin and Flagyl hold off further antibiotics at this time.  Patient states he will decide if he wants to pursue tissue sampling versus close monitoring and I will get back to us in coming days.    Savanna Salvador DO  Pulmonary Critical Care Medicine  [facility]  10/8/2024  11:43 AM

## 2024-10-08 NOTE — PROGRESS NOTES
Signed ALEYDA form faxed to [facility] at #[phone] with confirmation and sent for scanning.

## 2024-10-09 ENCOUNTER — TELEPHONE (OUTPATIENT)
Dept: PULMONOLOGY | Facility: CLINIC | Age: 39
End: 2024-10-09

## 2024-10-09 DIAGNOSIS — R91.1 LUNG NODULE: Primary | ICD-10-CM

## 2024-10-09 NOTE — TELEPHONE ENCOUNTER
Patient states that he was informed to call back after he makes a decision on treatment.  Patient is requesting to speak with MD, he has additional questions before he can make a decision.  Please call

## 2024-10-10 NOTE — TELEPHONE ENCOUNTER
Had lengthy discussion with patient regarding decision in regards to close monitoring versus potential biopsy of lung nodule.  States for now he recommends close monitoring given no significant change in size of lung nodule.  PET scan did not reveal significant hypermetabolic activity.  Repeat CT chest will be ordered in 6-month interval

## 2024-10-21 ENCOUNTER — TELEPHONE (OUTPATIENT)
Dept: PULMONOLOGY | Facility: CLINIC | Age: 39
End: 2024-10-21

## 2024-10-21 NOTE — TELEPHONE ENCOUNTER
Received Disc and report of CT Chest w/o Contrast done 7/26/2024 with [facility]. Placed in Dr. Salvador folder to review.

## 2024-12-08 ENCOUNTER — PATIENT MESSAGE (OUTPATIENT)
Dept: INTERNAL MEDICINE CLINIC | Facility: CLINIC | Age: 39
End: 2024-12-08

## 2024-12-09 ENCOUNTER — TELEPHONE (OUTPATIENT)
Dept: INTERNAL MEDICINE CLINIC | Facility: CLINIC | Age: 39
End: 2024-12-09

## 2024-12-09 NOTE — TELEPHONE ENCOUNTER
Continued Sleep Issues  (Newest Message First)  View All Conversations on this Encounter  Chao Gallegos [facility] Clinical Staff (supporting Daniel Segal MD)Yesterday (12:38 AM)       Hi Dr. Segal,     I have continued to struggle with my insomnia issues.  I feel it has gotten worse over the past month or so.  For example, I'll go to bed at 9pm and be up against just before midnight, and then be up all night long.     I have been doing a little research and found a new drug I was hoping to try...Daridorexant.  From what I've read, it's a newer drug on the marker, and wanted to see if it'd be ok to try it?     Please advise,     Logan Ruiz

## 2024-12-12 NOTE — TELEPHONE ENCOUNTER
Patient is calling and checking on the status of the call back.    Patient is concerned about his lack of sleeping.    Please advise

## 2025-02-02 ENCOUNTER — HOSPITAL ENCOUNTER (OUTPATIENT)
Age: 40
Discharge: HOME | End: 2025-02-02
Payer: COMMERCIAL

## 2025-02-02 ENCOUNTER — APPOINTMENT (OUTPATIENT)
Dept: GENERAL RADIOLOGY | Age: 40
End: 2025-02-02
Attending: NURSE PRACTITIONER
Payer: COMMERCIAL

## 2025-02-02 VITALS
SYSTOLIC BLOOD PRESSURE: 118 MMHG | HEART RATE: 67 BPM | OXYGEN SATURATION: 98 % | TEMPERATURE: 98 F | RESPIRATION RATE: 18 BRPM | DIASTOLIC BLOOD PRESSURE: 82 MMHG

## 2025-02-02 DIAGNOSIS — M79.602 LEFT ARM PAIN: Primary | ICD-10-CM

## 2025-02-02 PROCEDURE — 99213 OFFICE O/P EST LOW 20 MIN: CPT | Performed by: NURSE PRACTITIONER

## 2025-02-02 PROCEDURE — 73060 X-RAY EXAM OF HUMERUS: CPT | Performed by: NURSE PRACTITIONER

## 2025-02-02 NOTE — ED PROVIDER NOTES
Patient Seen in: [facility]      History     Chief Complaint   Patient presents with    Arm Pain     Stated Complaint: Lt arm pain    Subjective:   This is a 39-year-old male with a history of depression, GERD, and kidney stones.  Presents to immediate care for intermittent left arm pain.  Reports intermittent pain in the left bicep.  The symptoms have been ongoing for 3 months.  Reports pain is worse in the bicep when he lays on the area and when he touches the area.  He denies any numbness or tingling to the extremity.  He denies any rashes or lesions on the arm.  He denies any swelling to the extremity.  He denies any pain in the chest or shortness of breath.  Pain is not shooting from the chest down the arm.  He is getting relief from pain by taking ibuprofen.    The history is provided by the patient.             Objective:     Past Medical History:    Depression    Esophageal reflux    Kidney stone    KUB 2008              Past Surgical History:   Procedure Laterality Date     error tracking       error tracking      Excision turbinate,submucous  09/27/2018    Repair of nasal septum  09/27/2018                Social History     Socioeconomic History    Marital status: Single   Tobacco Use    Smoking status: Never     Passive exposure: Current    Smokeless tobacco: Never   Vaping Use    Vaping status: Never Used   Substance and Sexual Activity    Alcohol use: Yes     Comment: occassionally    Drug use: No   Other Topics Concern    Blood Transfusions No    Caffeine Concern No    Exercise No    Seat Belt Yes              Review of Systems   Constitutional:  Negative for chills and fever.   HENT:  Negative for congestion and sore throat.    Respiratory:  Negative for cough, shortness of breath and wheezing.    Cardiovascular:  Negative for chest pain, palpitations and leg swelling.   Gastrointestinal:  Negative for abdominal pain, diarrhea, nausea and vomiting.    Genitourinary:  Negative for dysuria.   Musculoskeletal:  Positive for arthralgias and myalgias. Negative for back pain, joint swelling, neck pain and neck stiffness.   Skin:  Negative for rash.   Neurological:  Negative for numbness and headaches.       Positive for stated complaint: Lt arm pain  Other systems are as noted in HPI.  Constitutional and vital signs reviewed.      All other systems reviewed and negative except as noted above.    Physical Exam     ED Triage Vitals [02/02/25 0841]   /82   Pulse 67   Resp 18   Temp 97.8 °F (36.6 °C)   Temp src Oral   SpO2 98 %   O2 Device None (Room air)       Current Vitals:   Vital Signs  BP: 118/82  Pulse: 67  Resp: 18  Temp: 97.8 °F (36.6 °C)  Temp src: Oral    Oxygen Therapy  SpO2: 98 %  O2 Device: None (Room air)        Physical Exam  Vitals and nursing note reviewed.   Constitutional:       General: He is not in acute distress.     Appearance: Normal appearance. He is obese. He is not ill-appearing, toxic-appearing or diaphoretic.   HENT:      Head: Normocephalic and atraumatic.      Right Ear: External ear normal.      Left Ear: External ear normal.      Nose: Nose normal.      Mouth/Throat:      Mouth: Mucous membranes are moist.      Pharynx: Oropharynx is clear.   Eyes:      General:         Right eye: No discharge.         Left eye: No discharge.      Extraocular Movements: Extraocular movements intact.      Conjunctiva/sclera: Conjunctivae normal.   Cardiovascular:      Rate and Rhythm: Normal rate and regular rhythm.      Heart sounds: Normal heart sounds. No murmur heard.  Pulmonary:      Effort: Pulmonary effort is normal.      Breath sounds: Normal breath sounds.   Musculoskeletal:      Left shoulder: Normal.      Left upper arm: Tenderness present. No swelling, edema, deformity, lacerations or bony tenderness.      Left elbow: Normal.        Arms:       Cervical back: Neck supple.      Right lower leg: No edema.      Left lower leg: No edema.    Skin:     General: Skin is warm and dry.      Capillary Refill: Capillary refill takes less than 2 seconds.      Findings: No rash.   Neurological:      Mental Status: He is alert and oriented to person, place, and time.   Psychiatric:         Mood and Affect: Mood normal.         Behavior: Behavior normal.             ED Course   Labs Reviewed - No data to display         X-ray of the left humerus, EKG       MDM      Vital signs stable.  Patient is well-appearing and nontoxic looking.  Presents to immediate care for intermittent left bicep pain.    Differential diagnosis includes but is not limited to tendinitis, arthritis, cellulitis, shingles, bursitis, muscle tear, muscle strain, myalgias, bone lesions, DVT, less likely fracture or cardiac event    Patient reports no chest pain, dizziness, or shortness of breath.  Pain is located in the mid bicep.  Pain is not shooting down the arm from the chest.  Pain is reproducible upon palpation to the mid bicep.  There is no swelling to the extremity or skin changes.  No suspicion for DVT or infectious process.    I have low to no suspicion for cardiac etiology of symptoms. Heart score is zero.   Patient expresses concerns that the source of pain may be his heart.  I discussed I would like to perform an EKG here in the clinic.  Patient is refusing an EKG with concerns of cost.  We discussed that an EKG would likely be no more cost to patient because we take his insurance here in the clinic.  He continued to refuse.    X-ray films interpreted and reviewed by myself.  Results show no acute findings    Clinical impression is muscle strain    DC home.  Continue resting and icing.  Recommended over-the-counter NSAIDs.  Close follow-up with his primary as he may require physical therapy if symptoms continue.  ER precautions reviewed.  He verbalized understanding, and agreed plan of care.  All questions answered    Medical Decision Making      Disposition and Plan     Clinical  Impression:  1. Left arm pain         Disposition:  Discharge  2/2/2025  9:21 am    Follow-up:  Daniel Segal MD  [address] [place] [id number]  [phone]    In 1 week            Medications Prescribed:  Current Discharge Medication List              Supplementary Documentation:

## 2025-02-02 NOTE — DISCHARGE INSTRUCTIONS
Resting and icing.  Continue over-the-counter NSAIDs.  Follow closely with your primary care provider as you may require physical therapy if symptoms continue.

## 2025-04-05 ENCOUNTER — HOSPITAL ENCOUNTER (OUTPATIENT)
Dept: CT IMAGING | Age: 40
Discharge: HOME | End: 2025-04-05
Attending: INTERNAL MEDICINE
Payer: COMMERCIAL

## 2025-04-05 DIAGNOSIS — R91.1 LUNG NODULE: ICD-10-CM

## 2025-04-05 LAB
CREAT BLD-MCNC: 1.1 MG/DL
EGFRCR SERPLBLD CKD-EPI 2021: 89 ML/MIN/1.73M2 (ref 60–?)

## 2025-04-05 PROCEDURE — 71260 CT THORAX DX C+: CPT | Performed by: INTERNAL MEDICINE

## 2025-04-05 PROCEDURE — 82565 ASSAY OF CREATININE: CPT

## 2025-04-10 ENCOUNTER — TELEPHONE (OUTPATIENT)
Dept: PULMONOLOGY | Facility: CLINIC | Age: 40
End: 2025-04-10

## 2025-04-10 DIAGNOSIS — R91.1 LUNG NODULE: Primary | ICD-10-CM

## 2025-04-11 NOTE — TELEPHONE ENCOUNTER
Let the patient know that I reviewed his CT chest.  Unchanged size and appearance of left lung nodule compared to previous CT.  Given no significant change in size I recommend follow-up CT chest 41718 in 1 year duration for April 2026.  If patient wants to come see me in the office and discuss it further okay to do so sometime over the next 1 to 2 months but I do not believe biopsy is necessary since nodule is stable.

## 2025-05-23 NOTE — TELEPHONE ENCOUNTER
Limit activity, wear shoe with good support, increase ice to 3-4 times per day for 15 minutes.  Appt next week should be right. scr
Please see message below and advise. Patient  has been taking steroid pack which seems to be helping but relief does not last all day. States has appt next Thursday wondering if he should be seen sooner ? Thanks!
Pt is having burning pain in the bottom right heel of the foot. Started on Wednesday 3-30-22.  Please call
Relayed Dr. Melvin Christina message patient in agreement has follow up scheduled next week.
Detail Level: Detailed

## 2025-08-08 ENCOUNTER — TELEPHONE (OUTPATIENT)
Age: 40
End: 2025-08-08

## 2025-10-13 ENCOUNTER — APPOINTMENT (OUTPATIENT)
Age: 40
End: 2025-10-13

## 2026-01-19 ENCOUNTER — APPOINTMENT (OUTPATIENT)
Age: 41
End: 2026-01-19

## (undated) NOTE — MR AVS SNAPSHOT
[place]SY [place]  [facility] [place] [address] [address] [address]  [phone]               Thank you for choosing us for your health care visit with Barrera Jacobo MD.  We are glad to serve you and happy to provide you with this summary of your visit.   Ple Take 1 tablet (20 mEq total) by mouth daily. Commonly known as:  K-DUR M20           Pramipexole Dihydrochloride 0.125 MG Tabs   Take 1 tablet (0.125 mg total) by mouth nightly.    Commonly known as:  MIRAPEX                Where to Get Your Medications

## (undated) NOTE — MR AVS SNAPSHOT
CHRISSY Jacob [address] [address]  [phone]               Thank you for choosing us for your health care visit with Siena Lau MD.  We are glad to serve you and happy to provide you with this summary of your visit.   Pl Take 1 tablet (0.125 mg total) by mouth nightly.    Commonly known as:  MIRAPEX                Where to Get Your Medications      These medications were sent to [facility] [address], [address], [address], [address]

## (undated) NOTE — LETTER
AUTHORIZATION FOR SURGICAL OPERATION OR OTHER PROCEDURE    1. I hereby authorize Dr. Arcadio Tang  and [facility] staff assigned to my case to perform the following operation and/or procedure at the [facility]:    Cortisone injection in Left heel  _______________________________________________________________________________________________      _______________________________________________________________________________________________    2. My physician has explained the nature and purpose of the operation or other procedure, possible alternative methods of treatment, the risks involved, and the possibility of complication to me. I acknowledge that no guarantee has been made as to the result that may be obtained. 3.  I recognize that, during the course of this operation, or other procedure, unforseen conditions may necessitate additional or different procedure than those listed above. I, therefore, further authorize and request that the above named physician, his/her physician assistants or designees perform such procedures as are, in his/her professional opinion, necessary and desirable. 4.  Any tissue or organs removed in the operation or other procedure may be disposed of by and at the discretion of the [facility] and [facility] AT [facility]. 5.  I understand that in the event of a medical emergency, I will be transported by local paramedics to [facility] or other hospital emergency department. 6.  I certify that I have read and fully understand the above consent to operation and/or other procedure. 7.  I acknowledge that my physician has explained sedation/analgesia administration to me including the risks and benefits. I consent to the administration of sedation/analgesia as may be necessary or desirable in the judgement of my physician.     Witness signature: ___________________________________________________ Date:  ______/______/_____ Time:  ________ A. M.  P.M. Patient Name:  ______________________________________________________  (please print)      Patient signature:  ___________________________________________________             Relationship to Patient:           []  Parent    Responsible person                          []  Spouse  In case of minor or                    [] Other  _____________   Incompetent name:  __________________________________________________                               (please print)      _____________      Responsible person  In case of minor or  Incompetent signature:  _______________________________________________    Statement of Physician  My signature below affirms that prior to the time of the procedure, I have explained to the patient and/or his/her guardian, the risks and benefits involved in the proposed treatment and any reasonable alternative to the proposed treatment. I have also explained the risks and benefits involved in the refusal of the proposed treatment and have answered the patient's questions.                         Date:  ______/______/_______  Provider                      Signature:  __________________________________________________________       Time:  ___________ A.M    P.M.

## (undated) NOTE — LETTER
[facility] [phone]    To Whom It May Concern:    Eleanor Rosado is currently under my medical care for pneumonia and may not return to work at this time. Please excuse Pradeep Almaraz from work at this time. He may return to work when he is feeling better, date not yet determined. If you require additional information please contact our office. Sincerely,      Nelly Kebede MD  [address] [place], [place] [id number]  [phone]        Document generated by:   Nelly Kebede MD

## (undated) NOTE — LETTER
2022              [address] [place] [address] [address] [id number]        : 89         To Whom it May Concern:     Clementine Klinefelter was seen in the office today for acute bronchitis. He had a negative COVID test on 22. He was given appropriate medical therapy. He is advised to stay home from work until he is feeling better. Please call if I may provide additional information. Sincerely,      Florence Kumari MD  [address] [place] [address]  [phone]        Document electronically generated by:   Florence Kumari MD

## (undated) NOTE — ED AVS SNAPSHOT
Med [facility]   MRN: [id number]    Department:  [facility] Emergency Department   Date of Visit:  11/17/2019           Disclosure     Insurance plans vary and the physician(s) referred by the ER may not be covered by your plan.  Please contact you tell this physician (or your personal doctor if your instructions are to return to your personal doctor) about any new or lasting problems. The primary care or specialist physician will see patients referred from the [facility] Emergency Department.  Zo Donovan

## (undated) NOTE — LETTER
10/4/2018          To Whom It May Concern:    Jacob Carias is currently under my medical care and may not return to work at this time. Please excuse Dandemetrius Clinton for 10 days. He may return to work on 10-8-18. Activity is restricted as follows: none.     If

## (undated) NOTE — MR AVS SNAPSHOT
[place]  [facility] [address] [address]  [phone]               Thank you for choosing us for your health care visit with Awilda Estrella MD.  We are glad to serve you and happy to provide you with this summary of your visit.   Please These medications were sent to [place] [id number] [address] [facility], [address], [address] [facility] [address]     Phone:  [phone]    - azithromycin 250 MG Tabs            Follow-up Inst

## (undated) NOTE — MR AVS SNAPSHOT
[facility]  [address] [facility], [address] [place] [address]  [address] [facility] [phone]               Thank you for choosing us for your health care visit with Refugio Freitas MD, MD.  We are glad to serve you and happy to provide you with this summary o ? Please allow the office 48-72 hours to fill the prescription. ? Patient must present photo ID at time of .   If a designated family member will be picking up prescription, office must be given name of individual in advance and they must present a ClonazePAM 0.5 MG Tabs   Take 1 tablet (0.5 mg total) by mouth nightly as needed (restless legs). Commonly known as:  KLONOPIN           hydrochlorothiazide 50 MG Tabs   Take 1 tablet (50 mg total) by mouth daily.    Commonly known as:  HYDRODIURIL

## (undated) NOTE — LETTER
No referring provider defined for this encounter. 08/04/18        Patient: Altaf Sánchez   YOB: 1985   Date of Visit: 8/4/2018       Dear  Dr. Alexandria Alvarez MD,      Thank you for referring Altaf Sánchez to my practice.   Please find

## (undated) NOTE — MR AVS SNAPSHOT
[place]RISSY [place] [address] [place] [address]  [phone]               Thank you for choosing us for your health care visit with Lois Banuelos MD.  We are glad to serve you and happy to provide you with this summary of your visit.   Ple Take 1 tablet (50 mg total) by mouth daily.    Commonly known as:  ZOLOFT                Where to Get Your Medications      These medications were sent to [facility] [address] [place] [address] [phone]

## (undated) NOTE — Clinical Note
[address] [facility], [address] [place] [address]  [address]  [phone]        Dear Mar Jasso MD,      I had the pleasure of seeing your patient, Emily Garcia on 3/6/2017.      Below please find a sum ClonazePAM 0.5 MG Oral Tab Take 1 tablet (0.5 mg total) by mouth nightly as needed (restless legs). Disp: 30 tablet Rfl: 3   Pramipexole Dihydrochloride 0.125 MG Oral Tab Take 1 tablet (0.125 mg total) by mouth nightly.  Disp: 90 tablet Rfl: 3   Potassium C NEURO: As in HPI    EXAM:     Vitals:  /76 mmHg  Pulse 76  Ht 68\"  Wt 175 lb  BMI 26.61 kg/m2  SpO2 98%   General appearance: In no distress  CV: No  Evidence of Carotid Bruits, Regular Rate and Rhythm.    Respiratory: Lungs clear  Abdomen:  No tende regime would include Neurontin, Sinemet, baclofen, or Elavil. After reviewing the side effect profile, he decided to continue his current medication, since he has had a positive response.   If he feels the medication is no longer effective, I would be happ

## (undated) NOTE — MR AVS SNAPSHOT
[place]  [facility] [address] [address]  [phone]               Thank you for choosing us for your health care visit with Kinsey Vega MD.  We are glad to serve you and happy to provide you with this summary of your visit.   Ple lmbang     Call the [employer] for assistance with your inactive lmbang account    If you have questions, you can call [phone] to talk to our [facility] Staff. Remember, lmbang is NOT to be used for urgent needs.   For medical emergencies

## (undated) NOTE — LETTER
Date: 8/28/2023    Patient Name: Erica Whitley          To Whom it may concern: This letter has been written at the patient's request. The above patient was seen at the [facility] for treatment of a medical condition. Patient tested positive for Covid on 8/28/23 with symptoms starting 8/25/23. CDC guidelines recommended.          Sincerely,      Zoraida Prado PA-C

## (undated) NOTE — LETTER
2017              [address] [place] [address], [address] [address] [facility]        : 89         To Whom it May Concern:     Stacy Chance is a patient who receives his medical care from this office.   He has a diagnosis

## (undated) NOTE — LETTER
[facility], [facility]  [address]  [facility] [address]  Dept: [phone]    AttGrecia Miller [phone]    To Whom It May Concern:    Rich Morrow is currently under my medical care for pneumonia. Please excuse Chao's absence from work until Monday, March 20, 2023, at which time he may return without restrictions. If you require any additional information, please contact our office.     Sincerely,        Zhen Knox MD

## (undated) NOTE — LETTER
AUTHORIZATION FOR SURGICAL OPERATION OR OTHER PROCEDURE    1. I hereby authorize Dr. Isis Kohli, and [facility] staff assigned to my case to perform the following operation and/or procedure at the [facility]:    _______________________________________________________________________________________________    Cortisone injection Right heel  _______________________________________________________________________________________________    2. My physician has explained the nature and purpose of the operation or other procedure, possible alternative methods of treatment, the risks involved, and the possibility of complication to me. I acknowledge that no guarantee has been made as to the result that may be obtained. 3.  I recognize that, during the course of this operation, or other procedure, unforseen conditions may necessitate additional or different procedure than those listed above. I, therefore, further authorize and request that the above named physician, his/her physician assistants or designees perform such procedures as are, in his/her professional opinion, necessary and desirable. 4.  Any tissue or organs removed in the operation or other procedure may be disposed of by and at the discretion of the [facility] and [facility] AT [facility]. 5.  I understand that in the event of a medical emergency, I will be transported by local paramedics to [facility] or other hospital emergency department. 6.  I certify that I have read and fully understand the above consent to operation and/or other procedure. 7.  I acknowledge that my physician has explained sedation/analgesia administration to me including the risks and benefits. I consent to the administration of sedation/analgesia as may be necessary or desirable in the judgement of my physician.     Witness signature: ___________________________________________________ Date:  ______/______/_____                    Time: ________ A. M.  P.M. Patient Name:  ______________________________________________________  (please print)      Patient signature:  ___________________________________________________             Relationship to Patient:           []  Parent    Responsible person                          []  Spouse  In case of minor or                    [] Other  _____________   Incompetent name:  __________________________________________________                               (please print)      _____________      Responsible person  In case of minor or  Incompetent signature:  _______________________________________________    Statement of Physician  My signature below affirms that prior to the time of the procedure, I have explained to the patient and/or his/her guardian, the risks and benefits involved in the proposed treatment and any reasonable alternative to the proposed treatment. I have also explained the risks and benefits involved in the refusal of the proposed treatment and have answered the patient's questions.                         Date:  ______/______/_______  Provider                      Signature:  __________________________________________________________       Time:  ___________ A.M    P.M.